# Patient Record
Sex: FEMALE | Race: BLACK OR AFRICAN AMERICAN | HISPANIC OR LATINO | Employment: STUDENT | URBAN - METROPOLITAN AREA
[De-identification: names, ages, dates, MRNs, and addresses within clinical notes are randomized per-mention and may not be internally consistent; named-entity substitution may affect disease eponyms.]

---
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
## 2021-01-08 ENCOUNTER — EVALUATION (OUTPATIENT)
Dept: PHYSICAL THERAPY | Facility: MEDICAL CENTER | Age: 21
End: 2021-01-08
Payer: COMMERCIAL

## 2021-01-08 DIAGNOSIS — M79.642 BILATERAL HAND PAIN: Primary | ICD-10-CM

## 2021-01-08 DIAGNOSIS — M79.641 BILATERAL HAND PAIN: Primary | ICD-10-CM

## 2021-01-08 DIAGNOSIS — M79.642 PAIN IN BOTH HANDS: ICD-10-CM

## 2021-01-08 DIAGNOSIS — M79.641 PAIN IN BOTH HANDS: ICD-10-CM

## 2021-01-08 PROCEDURE — 97161 PT EVAL LOW COMPLEX 20 MIN: CPT

## 2021-01-08 PROCEDURE — 97140 MANUAL THERAPY 1/> REGIONS: CPT

## 2021-01-08 NOTE — PROGRESS NOTES
PT Evaluation     Today's date: 2021  Patient name: Bibiana Zavala  : 2000  MRN: [id number]  Referring provider: Bon Villalta DO  Dx:   Encounter Diagnosis     ICD-10-CM    1  Bilateral hand pain  M79 641     [id number]            2/3/21: pt has not returned to therapy, D/C at this time       Assessment  Assessment details: Pt is a 22year old HD female who presents to PT with c/o BL hand pain  She presents with no tenderness upon palpation, full ROM of wrist and digits, no positive neuropathy tests, no visible atrophy or deformities, and no significant weakness/pain with testing in either hand  Pt to perform independent HEP consisting of stretching for wrist and digits as well as added strengthening with putty to help with muscle endurance in her intrinsics  We plan to follow up weekly with her home program to discuss any progress or issues that are continuing   Thank you kindly for your referral    Impairments: activity intolerance and pain with function  Barriers to therapy: Work schedule  Understanding of Dx/Px/POC: good   Prognosis: good    Goals  STG (2-3 weeks)  1: Pt independent in HEP  2: reduction of pain occurrence by 25%    LTG (3-4 weeks)  1: Improve FOTO 10 pts  2: Pt able to write without pain  3: improve pinch strength 2-3 lbs      Plan  Plan details: Initiate PT as per POC  Patient would benefit from: skilled physical therapy  Planned modality interventions: thermotherapy: hydrocollator packs and cryotherapy  Planned therapy interventions: manual therapy, massage, patient education, strengthening, stretching, therapeutic activities, therapeutic exercise, fine motor coordination training, flexibility, functional ROM exercises and home exercise program  Frequency: 1x week  Duration in visits: 3  Duration in weeks: 3  Plan of Care beginning date: 2021  Plan of Care expiration date: 2021  Treatment plan discussed with: patient        Subjective Evaluation    History of Present Illness  Mechanism of injury: Has been having cramping and pain in both hands for several years  Usually occurs when she is writing  No problem with lifting heavy items  New symptoms of shaking in L hand  Denies N/T- more cramping and tightness          Recurrent probem    Quality of life: good    Pain  Current pain ratin (L hand)  At best pain ratin  At worst pain ratin  Location: R hand > L  Quality: cramping  Progression: no change    Social Support    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)  Hand dominance: right  Exercise history: stepping ( dance)- clapping and stomping    Patient Goals  Patient goals for therapy: decreased pain and increased strength          Objective     Tenderness     Additional Tenderness Details  No tenderness to palpation  No visible atrophy to intrinsics    Neurological Testing     Sensation     Wrist/Hand   Left   Intact: light touch    Right   Intact: light touch    Additional Neurological Details  0 07g all digits west monofilament    Active Range of Motion     Left Wrist   Normal active range of motion    Right Wrist   Normal active range of motion    Additional Active Range of Motion Details  Full ROM of digits    Strength/Myotome Testing     Left Wrist/Hand      (2nd hand position)     Trial 1: 65    Thumb Strength  Key/Lateral Pinch     Trial 1: 16  Palmar/Three-Point Pinch     Trial 1: 9    Right Wrist/Hand      (2nd hand position)     Trial 1: 80    Thumb Strength   Key/Lateral Pinch     Trial 1: 20  Palmar/Three-Point Pinch     Trial 1: 13    Additional Strength Details  Thenar strength 4 to 4+/5 no pain with strength testing    Tests     Left Wrist/Hand   Negative Froment's sign, Phalen's sign and Tinel's sign (medial nerve)  Right Wrist/Hand   Negative Froment's sign, Phalen's sign and Tinel's sign (medial nerve)       Additional Tests Details  -reverse Phalen's             Precautions: ADHD, MDD, onset-3 years  PROM digits, wrist; putty (yellow) roll pinch, putty digit add/abd      Manuals             PROM digits, thenar, composite with wrist                                                    Neuro Re-Ed                                                                                                        Ther Ex             flexbar towel twist             flexbar sup/pron             fingerweb digit flexion             Wrist PRE's             clothespins                                                    Ther Activity             Grooved pegs                          Gait Training                                       Modalities                          CP PRN

## 2021-01-19 ENCOUNTER — OFFICE VISIT (OUTPATIENT)
Dept: FAMILY MEDICINE CLINIC | Facility: CLINIC | Age: 21
End: 2021-01-19
Payer: COMMERCIAL

## 2021-01-19 DIAGNOSIS — Z20.822 EXPOSURE TO COVID-19 VIRUS: Primary | ICD-10-CM

## 2021-01-19 PROCEDURE — 99213 OFFICE O/P EST LOW 20 MIN: CPT | Performed by: FAMILY MEDICINE

## 2021-01-19 PROCEDURE — 1036F TOBACCO NON-USER: CPT | Performed by: FAMILY MEDICINE

## 2021-01-19 PROCEDURE — 87635 SARS-COV-2 COVID-19 AMP PRB: CPT | Performed by: FAMILY MEDICINE

## 2021-01-19 NOTE — PROGRESS NOTES
Assessment/Plan:  COVID testing done at this time  Patient have supportive caring glued rest fluids Tylenol as needed       Diagnoses and all orders for this visit:    Exposure to COVID-19 virus  -     Novel Coronavirus (COVID-19), PCR [facility] Collected in Office            Subjective:        Patient ID: Norma villalobos a 22 y o  female  Patient is here with sore throat/pharyngitis with white dots on posterior pharynx past 5 days  Patient did have fever  Temperature was a 100 6° this was yesterday  Patient did use Advil and Tylenol  Patient with some body aches  No nausea vomiting or diarrhea  No loss of smell or taste  No significant new rhinorrhea or nasal congestion  Patient exposed to COVID positive patient on Saturday night  Patient was not masked  The following portions of the patient's history were reviewed and updated as appropriate: allergies, current medications, past family history, past medical history, past social history, past surgical history and problem list       Review of Systems   Constitutional: Positive for fatigue  HENT: Positive for sore throat  Eyes: Negative  Respiratory: Negative  Cardiovascular: Negative  Gastrointestinal: Negative  Endocrine: Negative  Genitourinary: Negative  Musculoskeletal: Positive for arthralgias  Skin: Negative  Allergic/Immunologic: Negative  Neurological: Negative  Hematological: Negative  Psychiatric/Behavioral: Negative  Objective:      BMI Counseling: There is no height or weight on file to calculate BMI  The BMI is above normal  Nutrition recommendations include decreasing portion sizes  Exercise recommendations include moderate physical activity 150 minutes/week  There were no vitals taken for this visit  Physical Exam  Vitals signs and nursing note reviewed  Constitutional:       General: She is not in acute distress  Appearance: Normal appearance   She is not ill-appearing, toxic-appearing or diaphoretic  HENT:      Head: Normocephalic and atraumatic  Right Ear: Tympanic membrane, ear canal and external ear normal  There is no impacted cerumen  Left Ear: Tympanic membrane, ear canal and external ear normal  There is no impacted cerumen  Nose: Nose normal  No congestion or rhinorrhea  Mouth/Throat:      Mouth: Mucous membranes are moist       Pharynx: No oropharyngeal exudate or posterior oropharyngeal erythema  Eyes:      General: No scleral icterus  Right eye: No discharge  Left eye: No discharge  Extraocular Movements: Extraocular movements intact  Conjunctiva/sclera: Conjunctivae normal       Pupils: Pupils are equal, round, and reactive to light  Neck:      Musculoskeletal: Normal range of motion and neck supple  No neck rigidity or muscular tenderness  Vascular: No carotid bruit  Cardiovascular:      Rate and Rhythm: Normal rate and regular rhythm  Pulses: Normal pulses  Heart sounds: Normal heart sounds  No murmur  No friction rub  No gallop  Pulmonary:      Effort: Pulmonary effort is normal  No respiratory distress  Breath sounds: Normal breath sounds  No stridor  No wheezing, rhonchi or rales  Chest:      Chest wall: No tenderness  Abdominal:      General: Abdomen is flat  Bowel sounds are normal  There is no distension  Palpations: Abdomen is soft  Tenderness: There is no abdominal tenderness  There is no guarding or rebound  Musculoskeletal: Normal range of motion  General: No swelling, tenderness, deformity or signs of injury  Right lower leg: No edema  Left lower leg: No edema  Lymphadenopathy:      Cervical: No cervical adenopathy  Skin:     General: Skin is warm and dry  Capillary Refill: Capillary refill takes less than 2 seconds  Coloration: Skin is not jaundiced  Findings: No bruising, erythema, lesion or rash     Neurological: General: No focal deficit present  Mental Status: She is alert and oriented to person, place, and time  Cranial Nerves: No cranial nerve deficit  Sensory: No sensory deficit  Motor: No weakness  Coordination: Coordination normal       Gait: Gait normal    Psychiatric:         Mood and Affect: Mood normal          Behavior: Behavior normal          Thought Content:  Thought content normal          Judgment: Judgment normal

## 2021-01-21 ENCOUNTER — TELEPHONE (OUTPATIENT)
Dept: FAMILY MEDICINE CLINIC | Facility: CLINIC | Age: 21
End: 2021-01-21

## 2021-01-21 ENCOUNTER — APPOINTMENT (OUTPATIENT)
Dept: PHYSICAL THERAPY | Facility: MEDICAL CENTER | Age: 21
End: 2021-01-21
Payer: COMMERCIAL

## 2021-01-21 DIAGNOSIS — J02.9 SORE THROAT: Primary | ICD-10-CM

## 2021-01-21 DIAGNOSIS — F32.0 CURRENT MILD EPISODE OF MAJOR DEPRESSIVE DISORDER WITHOUT PRIOR EPISODE (HCC): ICD-10-CM

## 2021-01-21 LAB — SARS-COV-2 RNA RESP QL NAA+PROBE: NEGATIVE

## 2021-01-21 RX ORDER — AMOXICILLIN 500 MG/1
500 TABLET, FILM COATED ORAL 2 TIMES DAILY
Qty: 28 TABLET | Refills: 0 | Status: SHIPPED | OUTPATIENT
Start: 2021-01-21 | End: 2021-02-04

## 2021-01-21 RX ORDER — BUPROPION HYDROCHLORIDE 150 MG/1
TABLET ORAL
Qty: 30 TABLET | Refills: 1 | Status: SHIPPED | OUTPATIENT
Start: 2021-01-21 | End: 2021-02-21

## 2021-01-21 NOTE — TELEPHONE ENCOUNTER
Patient's mother left message on nurse line  Patient had office visit 1/19, had negative COVID, but still has white pockets on throat  She wants something sent to [facility] for her  Also needs return to work note  I tried to call mother back at number given, no answer, phone kept ringing  Called patient cell phone number listed, left message to call back

## 2021-01-25 ENCOUNTER — TELEPHONE (OUTPATIENT)
Dept: FAMILY MEDICINE CLINIC | Facility: CLINIC | Age: 21
End: 2021-01-25

## 2021-01-25 NOTE — TELEPHONE ENCOUNTER
PT has not had a fever x 24 hrs and would like a return to work note  When do you want her to go back?

## 2021-02-20 DIAGNOSIS — F32.0 CURRENT MILD EPISODE OF MAJOR DEPRESSIVE DISORDER WITHOUT PRIOR EPISODE (HCC): ICD-10-CM

## 2021-02-21 RX ORDER — BUPROPION HYDROCHLORIDE 150 MG/1
TABLET ORAL
Qty: 30 TABLET | Refills: 1 | Status: SHIPPED | OUTPATIENT
Start: 2021-02-21 | End: 2021-03-26

## 2021-03-01 DIAGNOSIS — F90.1 ATTENTION DEFICIT HYPERACTIVITY DISORDER (ADHD), PREDOMINANTLY HYPERACTIVE TYPE: ICD-10-CM

## 2021-03-01 RX ORDER — DEXTROAMPHETAMINE SACCHARATE, AMPHETAMINE ASPARTATE MONOHYDRATE, DEXTROAMPHETAMINE SULFATE AND AMPHETAMINE SULFATE 6.25; 6.25; 6.25; 6.25 MG/1; MG/1; MG/1; MG/1
25 CAPSULE, EXTENDED RELEASE ORAL EVERY MORNING
Qty: 30 CAPSULE | Refills: 0 | Status: SHIPPED | OUTPATIENT
Start: 2021-03-01 | End: 2021-10-11 | Stop reason: SDUPTHER

## 2021-03-01 NOTE — TELEPHONE ENCOUNTER
Patient's mother called back requesting written letter with today's date stating that she is currently taking Adderall  Please advise  Thank you

## 2021-03-03 ENCOUNTER — OFFICE VISIT (OUTPATIENT)
Dept: OBGYN CLINIC | Facility: CLINIC | Age: 21
End: 2021-03-03
Payer: COMMERCIAL

## 2021-03-03 VITALS — DIASTOLIC BLOOD PRESSURE: 80 MMHG | BODY MASS INDEX: 35.07 KG/M2 | WEIGHT: 204.3 LBS | SYSTOLIC BLOOD PRESSURE: 115 MMHG

## 2021-03-03 DIAGNOSIS — B96.89 BV (BACTERIAL VAGINOSIS): ICD-10-CM

## 2021-03-03 DIAGNOSIS — Z72.51 HIGH RISK SEXUAL BEHAVIOR, UNSPECIFIED TYPE: ICD-10-CM

## 2021-03-03 DIAGNOSIS — N76.0 BV (BACTERIAL VAGINOSIS): ICD-10-CM

## 2021-03-03 DIAGNOSIS — Z30.09 BIRTH CONTROL COUNSELING: Primary | ICD-10-CM

## 2021-03-03 PROCEDURE — 87491 CHLMYD TRACH DNA AMP PROBE: CPT | Performed by: OBSTETRICS & GYNECOLOGY

## 2021-03-03 PROCEDURE — 99214 OFFICE O/P EST MOD 30 MIN: CPT | Performed by: OBSTETRICS & GYNECOLOGY

## 2021-03-03 PROCEDURE — 87591 N.GONORRHOEAE DNA AMP PROB: CPT | Performed by: OBSTETRICS & GYNECOLOGY

## 2021-03-03 PROCEDURE — 1036F TOBACCO NON-USER: CPT | Performed by: OBSTETRICS & GYNECOLOGY

## 2021-03-03 RX ORDER — MEDROXYPROGESTERONE ACETATE 150 MG/ML
150 INJECTION, SUSPENSION INTRAMUSCULAR
Qty: 1 ML | Refills: 3 | Status: SHIPPED | OUTPATIENT
Start: 2021-03-03 | End: 2021-12-09

## 2021-03-03 RX ORDER — METRONIDAZOLE 500 MG/1
500 TABLET ORAL EVERY 12 HOURS SCHEDULED
Qty: 14 TABLET | Refills: 0 | Status: SHIPPED | OUTPATIENT
Start: 2021-03-03 | End: 2021-03-10

## 2021-03-03 NOTE — PROGRESS NOTES
Assessment Parker Hector was seen today for possible infection  Diagnoses and all orders for this visit:    Birth control counseling  -     medroxyPROGESTERone (DEPO-PROVERA) 150 mg/mL injection; Inject 1 mL (150 mg total) into a muscle every 3 (three) months    High risk sexual behavior, unspecified type  -     Cancel: Chlamydia/GC amplified DNA by PCR; Future  -     Chlamydia/GC amplified DNA by PCR    BV (bacterial vaginosis)  -     metroNIDAZOLE (FLAGYL) 500 mg tablet; Take 1 tablet (500 mg total) by mouth every 12 (twelve) hours for 7 days         Plan  Discussed clue cells present on wet mount consistent with BV  Treatment options discussed ans sent to pharmacy/ G/C collected to verify no other cause of infection  We discussed available options of birth control that are less user dependant and therefore increase compliance, possible risks of each discussed   Mostly interested in Depo   This was sent to pharmacy and patient will need to shcedule nurse visit   Safe sex practices discussed as well as Plan B    Abel Uribe is a 22 y o  female here for a problem visit  Patient is complaining of vaginal discharge and odor for one week   Has not changed anything in her routine other than a new partner   Denies white discharge or irregular bleeding  States desires to discuss birth control options as she is not compliant with daily pill   Patient Active Problem List   Diagnosis    Allergic rhinitis    Attention deficit hyperactivity disorder    Headache, migraine    Vitamin D deficiency    Thyromegaly    Right hand pain    Rhinosinusitis    Functional diarrhea    Left foot pain    Costochondritis    Precordial pain    Tibial pain    Leg swelling    Current episode of major depressive disorder without prior episode    Pain in both hands       Gynecologic History  Patient's last menstrual period was 02/26/2021  The current method of family planning is none      Past Medical History: Diagnosis Date    Migraine      Past Surgical History:   Procedure Laterality Date    WISDOM TOOTH EXTRACTION       History reviewed  No pertinent family history  Social History     Socioeconomic History    Marital status: Single     Spouse name: Not on file    Number of children: Not on file    Years of education: Not on file    Highest education level: Not on file   Occupational History    Occupation:    Social Needs    Financial resource strain: Not on file    Food insecurity     Worry: Not on file     Inability: Not on file    Transportation needs     Medical: Not on file     Non-medical: Not on file   Tobacco Use    Smoking status: Never Smoker    Smokeless tobacco: Never Used   Substance and Sexual Activity    Alcohol use: Yes     Frequency: Never    Drug use: Yes     Types: Marijuana    Sexual activity: Yes     Partners: Male     Birth control/protection: Pill   Lifestyle    Physical activity     Days per week: Not on file     Minutes per session: Not on file    Stress: Not on file   Relationships    Social connections     Talks on phone: Not on file     Gets together: Not on file     Attends [employer] service: Not on file     Active member of club or organization: Not on file     Attends meetings of clubs or organizations: Not on file     Relationship status: Not on file    Intimate partner violence     Fear of current or ex partner: Not on file     Emotionally abused: Not on file     Physically abused: Not on file     Forced sexual activity: Not on file   Other Topics Concern    Not on file   Social History Narrative    Not on file     Allergies   Allergen Reactions    Kiwi Extract     Other Other (See Comments)     Cherries, sesame seed, clams, bananas  Patient has itching in throat      Peanut Oil      Elevated titer with labs       Current Outpatient Medications:     amphetamine-dextroamphetamine (ADDERALL XR) 25 MG 24 hr capsule, Take 1 capsule (25 mg total) by mouth every morningMax Daily Amount: 25 mg, Disp: 30 capsule, Rfl: 0    buPROPion (WELLBUTRIN XL) 150 mg 24 hr tablet, TAKE 1 TABLET BY MOUTH EVERY DAY IN THE MORNING, Disp: 30 tablet, Rfl: 1    EPINEPHrine (EpiPen 2-Jacinto) 0 3 mg/0 3 mL SOAJ, Inject 0 3 mL (0 3 mg total) into a muscle as needed for anaphylaxis, Disp: 4 each, Rfl: 1    montelukast (SINGULAIR) 10 mg tablet, Take 1 tablet (10 mg total) by mouth daily, Disp: 90 tablet, Rfl: 1    medroxyPROGESTERone (DEPO-PROVERA) 150 mg/mL injection, Inject 1 mL (150 mg total) into a muscle every 3 (three) months, Disp: 1 mL, Rfl: 3    metroNIDAZOLE (FLAGYL) 500 mg tablet, Take 1 tablet (500 mg total) by mouth every 12 (twelve) hours for 7 days, Disp: 14 tablet, Rfl: 0    norgestimate-ethinyl estradiol (ORTHO-CYCLEN) 0 25-35 MG-MCG per tablet, Take 1 tablet by mouth daily (Patient not taking: Reported on 3/3/2021), Disp: 84 tablet, Rfl: 3    Review of Systems  Constitutional :no fever, feels well, no tiredness, no recent weight gain or loss  ENT: no ear ache, no loss of hearing, no nosebleeds or nasal discharge, no sore throat or hoarseness  Cardiovascular: no complaints of slow or fast heart beat, no chest pain, no palpitations, no leg claudication or lower extremity edema  Respiratory: no complaints of shortness of shortness of breath, no MEYERS  Breasts:no complaints of breast pain, breast lump, or nipple discharge  Gastrointestinal: no complaints of abdominal pain, constipation, nausea, vomiting, or diarrhea or bloody stools  Genitourinary : no complaints of dysuria, incontinence, pelvic pain, no dysmenorrhea, vaginal discharge or abnormal vaginal bleeding and as noted in HPI  Musculoskeletal: no complaints of arthralgia, no myalgia, no joint swelling or stiffness, no limb pain or swelling    Integumentary: no complaints of skin rash or lesion, itching or dry skin  Neurological: no complaints of headache, no confusion, no numbness or tingling, no dizziness or fainting     Objective     /80   Wt 92 7 kg (204 lb 4 8 oz)   LMP 02/26/2021   BMI 35 07 kg/m²     General:   appears stated age, cooperative, alert normal mood and affect   Lungs: Unlabored breathing    Abdomen: soft, non-tender, without masses or organomegaly   Vulva: normal   Vagina: normal vagina, no discharge, exudate, lesion, or erythema   Urethra: normal   Cervix: Normal, no discharge  Nontender  Uterus: normal size, contour, position, consistency, mobility, non-tender   Adnexa: no mass, fullness, tenderness   Lymphatic palpation of lymph nodes in neck, axilla, groin and/or other locations: no lymphadenopathy or masses noted   Skin normal skin turgor and no rashes     Psychiatric orientation to person, place, and time: normal  mood and affect: normal   koh  And wet mount done - positive for clue cells

## 2021-03-04 LAB
C TRACH DNA SPEC QL NAA+PROBE: NEGATIVE
N GONORRHOEA DNA SPEC QL NAA+PROBE: NEGATIVE

## 2021-03-25 DIAGNOSIS — F32.0 CURRENT MILD EPISODE OF MAJOR DEPRESSIVE DISORDER WITHOUT PRIOR EPISODE (HCC): ICD-10-CM

## 2021-03-26 RX ORDER — BUPROPION HYDROCHLORIDE 150 MG/1
TABLET ORAL
Qty: 30 TABLET | Refills: 1 | Status: SHIPPED | OUTPATIENT
Start: 2021-03-26 | End: 2021-04-30

## 2021-04-27 ENCOUNTER — OFFICE VISIT (OUTPATIENT)
Dept: FAMILY MEDICINE CLINIC | Facility: CLINIC | Age: 21
End: 2021-04-27
Payer: COMMERCIAL

## 2021-04-27 VITALS — TEMPERATURE: 98.6 F

## 2021-04-27 DIAGNOSIS — J03.91 RECURRENT TONSILLITIS: Primary | ICD-10-CM

## 2021-04-27 PROCEDURE — 99213 OFFICE O/P EST LOW 20 MIN: CPT | Performed by: FAMILY MEDICINE

## 2021-04-27 RX ORDER — AMOXICILLIN AND CLAVULANATE POTASSIUM 875; 125 MG/1; MG/1
1 TABLET, FILM COATED ORAL EVERY 12 HOURS SCHEDULED
Qty: 14 TABLET | Refills: 0 | Status: SHIPPED | OUTPATIENT
Start: 2021-04-27 | End: 2021-05-04

## 2021-04-27 NOTE — PROGRESS NOTES
Assessment/Plan: patient use Augmentin as directed  Due to recurrence patient will see ENT  Diagnoses and all orders for this visit:    Recurrent tonsillitis  -     amoxicillin-clavulanate (AUGMENTIN) 875-125 mg per tablet; Take 1 tablet by mouth every 12 (twelve) hours for 7 days  -     Ambulatory Referral to Otolaryngology; Future            Subjective:        Patient ID: Kingsley Summers is a 22 y o  female  Patient is here with pharyngitis /tonsillar swelling over the past week  No fevers or chills  No significant URI symptoms beyond this  No loss of smell or taste or diffuse body aches or nausea vomiting  No other complaints  No significant treatment use  Patient has notice recurrence pharyngitis/tonsillitis        The following portions of the patient's history were reviewed and updated as appropriate: allergies, current medications, past family history, past medical history, past social history, past surgical history and problem list       Review of Systems   Constitutional: Negative  HENT: Positive for sore throat  Eyes: Negative  Respiratory: Negative  Cardiovascular: Negative  Gastrointestinal: Negative  Endocrine: Negative  Genitourinary: Negative  Musculoskeletal: Negative  Skin: Negative  Allergic/Immunologic: Negative  Neurological: Negative  Hematological: Negative  Psychiatric/Behavioral: Negative  Objective:               Temp 98 6 °F (37 °C) (Tympanic)   LMP 04/15/2021 (Approximate)          Physical Exam  Vitals signs and nursing note reviewed  Constitutional:       General: She is not in acute distress  Appearance: Normal appearance  She is not ill-appearing, toxic-appearing or diaphoretic  HENT:      Head: Normocephalic and atraumatic  Right Ear: Tympanic membrane, ear canal and external ear normal  There is no impacted cerumen        Left Ear: Tympanic membrane, ear canal and external ear normal  There is no impacted cerumen  Nose: Nose normal  No congestion or rhinorrhea  Mouth/Throat:      Mouth: Mucous membranes are moist       Pharynx: Posterior oropharyngeal erythema present  No oropharyngeal exudate  Eyes:      General: No scleral icterus  Right eye: No discharge  Left eye: No discharge  Extraocular Movements: Extraocular movements intact  Conjunctiva/sclera: Conjunctivae normal       Pupils: Pupils are equal, round, and reactive to light  Neck:      Musculoskeletal: Normal range of motion and neck supple  No neck rigidity or muscular tenderness  Vascular: No carotid bruit  Cardiovascular:      Rate and Rhythm: Normal rate and regular rhythm  Pulses: Normal pulses  Heart sounds: Normal heart sounds  No murmur  No friction rub  No gallop  Pulmonary:      Effort: Pulmonary effort is normal  No respiratory distress  Breath sounds: Normal breath sounds  No stridor  No wheezing, rhonchi or rales  Chest:      Chest wall: No tenderness  Musculoskeletal: Normal range of motion  General: No swelling, tenderness, deformity or signs of injury  Right lower leg: No edema  Left lower leg: No edema  Lymphadenopathy:      Cervical: No cervical adenopathy  Skin:     General: Skin is warm and dry  Capillary Refill: Capillary refill takes less than 2 seconds  Coloration: Skin is not jaundiced  Findings: No bruising, erythema, lesion or rash  Neurological:      Mental Status: She is alert and oriented to person, place, and time  Mental status is at baseline  Cranial Nerves: No cranial nerve deficit  Sensory: No sensory deficit  Motor: No weakness  Coordination: Coordination normal       Gait: Gait normal    Psychiatric:         Mood and Affect: Mood normal          Behavior: Behavior normal          Thought Content:  Thought content normal          Judgment: Judgment normal

## 2021-04-30 DIAGNOSIS — F32.0 CURRENT MILD EPISODE OF MAJOR DEPRESSIVE DISORDER WITHOUT PRIOR EPISODE (HCC): ICD-10-CM

## 2021-04-30 RX ORDER — BUPROPION HYDROCHLORIDE 150 MG/1
TABLET ORAL
Qty: 30 TABLET | Refills: 1 | Status: SHIPPED | OUTPATIENT
Start: 2021-04-30 | End: 2021-05-14

## 2021-05-14 DIAGNOSIS — F32.0 CURRENT MILD EPISODE OF MAJOR DEPRESSIVE DISORDER WITHOUT PRIOR EPISODE (HCC): ICD-10-CM

## 2021-05-14 RX ORDER — BUPROPION HYDROCHLORIDE 150 MG/1
TABLET ORAL
Qty: 90 TABLET | Refills: 0 | Status: SHIPPED | OUTPATIENT
Start: 2021-05-14 | End: 2021-12-09 | Stop reason: SDUPTHER

## 2021-07-03 DIAGNOSIS — J45.909 ASTHMA DUE TO ENVIRONMENTAL ALLERGIES: ICD-10-CM

## 2021-07-04 RX ORDER — MONTELUKAST SODIUM 10 MG/1
TABLET ORAL
Qty: 90 TABLET | Refills: 1 | Status: SHIPPED | OUTPATIENT
Start: 2021-07-04 | End: 2021-10-11 | Stop reason: SDUPTHER

## 2021-08-16 ENCOUNTER — APPOINTMENT (OUTPATIENT)
Dept: LAB | Facility: MEDICAL CENTER | Age: 21
End: 2021-08-16
Payer: COMMERCIAL

## 2021-08-16 DIAGNOSIS — Z11.3 SCREENING FOR STDS (SEXUALLY TRANSMITTED DISEASES): Primary | ICD-10-CM

## 2021-08-16 DIAGNOSIS — Z11.3 SCREENING FOR STDS (SEXUALLY TRANSMITTED DISEASES): ICD-10-CM

## 2021-08-16 PROCEDURE — 87591 N.GONORRHOEAE DNA AMP PROB: CPT

## 2021-08-16 PROCEDURE — 86592 SYPHILIS TEST NON-TREP QUAL: CPT

## 2021-08-16 PROCEDURE — 87491 CHLMYD TRACH DNA AMP PROBE: CPT

## 2021-08-16 PROCEDURE — 36415 COLL VENOUS BLD VENIPUNCTURE: CPT

## 2021-08-16 PROCEDURE — 86803 HEPATITIS C AB TEST: CPT

## 2021-08-16 PROCEDURE — 87389 HIV-1 AG W/HIV-1&-2 AB AG IA: CPT

## 2021-08-16 PROCEDURE — 87340 HEPATITIS B SURFACE AG IA: CPT

## 2021-08-17 LAB
C TRACH DNA SPEC QL NAA+PROBE: NEGATIVE
HBV SURFACE AG SER QL: NORMAL
HCV AB SER QL: NORMAL
HIV 1+2 AB+HIV1 P24 AG SERPL QL IA: NORMAL
N GONORRHOEA DNA SPEC QL NAA+PROBE: NEGATIVE
RPR SER QL: NORMAL

## 2021-10-11 DIAGNOSIS — J45.909 ASTHMA DUE TO ENVIRONMENTAL ALLERGIES: ICD-10-CM

## 2021-10-11 DIAGNOSIS — F90.1 ATTENTION DEFICIT HYPERACTIVITY DISORDER (ADHD), PREDOMINANTLY HYPERACTIVE TYPE: ICD-10-CM

## 2021-10-12 RX ORDER — DEXTROAMPHETAMINE SACCHARATE, AMPHETAMINE ASPARTATE MONOHYDRATE, DEXTROAMPHETAMINE SULFATE AND AMPHETAMINE SULFATE 6.25; 6.25; 6.25; 6.25 MG/1; MG/1; MG/1; MG/1
25 CAPSULE, EXTENDED RELEASE ORAL EVERY MORNING
Qty: 30 CAPSULE | Refills: 0 | Status: SHIPPED | OUTPATIENT
Start: 2021-10-12 | End: 2022-02-08

## 2021-10-12 RX ORDER — MONTELUKAST SODIUM 10 MG/1
10 TABLET ORAL DAILY
Qty: 30 TABLET | Refills: 2 | Status: SHIPPED | OUTPATIENT
Start: 2021-10-12 | End: 2022-04-26

## 2021-12-09 ENCOUNTER — OFFICE VISIT (OUTPATIENT)
Dept: FAMILY MEDICINE CLINIC | Facility: CLINIC | Age: 21
End: 2021-12-09
Payer: COMMERCIAL

## 2021-12-09 VITALS
BODY MASS INDEX: 36.77 KG/M2 | HEIGHT: 64 IN | DIASTOLIC BLOOD PRESSURE: 88 MMHG | WEIGHT: 215.4 LBS | SYSTOLIC BLOOD PRESSURE: 126 MMHG | TEMPERATURE: 96.9 F

## 2021-12-09 DIAGNOSIS — Z00.00 WELL ADULT EXAM: Primary | ICD-10-CM

## 2021-12-09 DIAGNOSIS — Z23 NEED FOR IMMUNIZATION AGAINST INFLUENZA: ICD-10-CM

## 2021-12-09 DIAGNOSIS — F32.0 CURRENT MILD EPISODE OF MAJOR DEPRESSIVE DISORDER WITHOUT PRIOR EPISODE (HCC): ICD-10-CM

## 2021-12-09 PROCEDURE — 90682 RIV4 VACC RECOMBINANT DNA IM: CPT

## 2021-12-09 PROCEDURE — 90471 IMMUNIZATION ADMIN: CPT

## 2021-12-09 PROCEDURE — 99395 PREV VISIT EST AGE 18-39: CPT | Performed by: FAMILY MEDICINE

## 2021-12-09 RX ORDER — BUPROPION HYDROCHLORIDE 150 MG/1
150 TABLET ORAL EVERY MORNING
Qty: 90 TABLET | Refills: 0 | Status: SHIPPED | OUTPATIENT
Start: 2021-12-09 | End: 2022-02-08

## 2021-12-16 ENCOUNTER — LAB (OUTPATIENT)
Dept: LAB | Facility: MEDICAL CENTER | Age: 21
End: 2021-12-16
Payer: COMMERCIAL

## 2021-12-16 DIAGNOSIS — Z00.00 WELL ADULT EXAM: ICD-10-CM

## 2021-12-16 LAB
ALBUMIN SERPL BCP-MCNC: 4.2 G/DL (ref 3.5–5)
ALP SERPL-CCNC: 96 U/L (ref 46–116)
ALT SERPL W P-5'-P-CCNC: 23 U/L (ref 12–78)
ANION GAP SERPL CALCULATED.3IONS-SCNC: 10 MMOL/L (ref 4–13)
AST SERPL W P-5'-P-CCNC: 12 U/L (ref 5–45)
BASOPHILS # BLD AUTO: 0.05 THOUSANDS/ΜL (ref 0–0.1)
BASOPHILS NFR BLD AUTO: 0 % (ref 0–1)
BILIRUB SERPL-MCNC: 0.82 MG/DL (ref 0.2–1)
BUN SERPL-MCNC: 7 MG/DL (ref 5–25)
CALCIUM SERPL-MCNC: 9.8 MG/DL (ref 8.3–10.1)
CHLORIDE SERPL-SCNC: 109 MMOL/L (ref 100–108)
CHOLEST SERPL-MCNC: 148 MG/DL
CO2 SERPL-SCNC: 18 MMOL/L (ref 21–32)
CREAT SERPL-MCNC: 0.8 MG/DL (ref 0.6–1.3)
EOSINOPHIL # BLD AUTO: 0.29 THOUSAND/ΜL (ref 0–0.61)
EOSINOPHIL NFR BLD AUTO: 2 % (ref 0–6)
ERYTHROCYTE [DISTWIDTH] IN BLOOD BY AUTOMATED COUNT: 14.5 % (ref 11.6–15.1)
GFR SERPL CREATININE-BSD FRML MDRD: 106 ML/MIN/1.73SQ M
GLUCOSE P FAST SERPL-MCNC: 80 MG/DL (ref 65–99)
HCT VFR BLD AUTO: 36.9 % (ref 34.8–46.1)
HDLC SERPL-MCNC: 35 MG/DL
HGB BLD-MCNC: 12 G/DL (ref 11.5–15.4)
IMM GRANULOCYTES # BLD AUTO: 0.03 THOUSAND/UL (ref 0–0.2)
IMM GRANULOCYTES NFR BLD AUTO: 0 % (ref 0–2)
LDLC SERPL CALC-MCNC: 92 MG/DL (ref 0–100)
LYMPHOCYTES # BLD AUTO: 3.31 THOUSANDS/ΜL (ref 0.6–4.47)
LYMPHOCYTES NFR BLD AUTO: 27 % (ref 14–44)
MCH RBC QN AUTO: 26 PG (ref 26.8–34.3)
MCHC RBC AUTO-ENTMCNC: 32.5 G/DL (ref 31.4–37.4)
MCV RBC AUTO: 80 FL (ref 82–98)
MONOCYTES # BLD AUTO: 1.03 THOUSAND/ΜL (ref 0.17–1.22)
MONOCYTES NFR BLD AUTO: 8 % (ref 4–12)
NEUTROPHILS # BLD AUTO: 7.79 THOUSANDS/ΜL (ref 1.85–7.62)
NEUTS SEG NFR BLD AUTO: 63 % (ref 43–75)
NONHDLC SERPL-MCNC: 113 MG/DL
NRBC BLD AUTO-RTO: 0 /100 WBCS
PLATELET # BLD AUTO: 400 THOUSANDS/UL (ref 149–390)
PMV BLD AUTO: 9.2 FL (ref 8.9–12.7)
POTASSIUM SERPL-SCNC: 3.7 MMOL/L (ref 3.5–5.3)
PROT SERPL-MCNC: 8.4 G/DL (ref 6.4–8.2)
RBC # BLD AUTO: 4.61 MILLION/UL (ref 3.81–5.12)
SODIUM SERPL-SCNC: 137 MMOL/L (ref 136–145)
TRIGL SERPL-MCNC: 106 MG/DL
TSH SERPL DL<=0.05 MIU/L-ACNC: 2.86 UIU/ML (ref 0.46–3.98)
WBC # BLD AUTO: 12.5 THOUSAND/UL (ref 4.31–10.16)

## 2021-12-16 PROCEDURE — 84443 ASSAY THYROID STIM HORMONE: CPT

## 2021-12-16 PROCEDURE — 80053 COMPREHEN METABOLIC PANEL: CPT

## 2021-12-16 PROCEDURE — 80061 LIPID PANEL: CPT

## 2021-12-16 PROCEDURE — 85025 COMPLETE CBC W/AUTO DIFF WBC: CPT

## 2021-12-16 PROCEDURE — 36415 COLL VENOUS BLD VENIPUNCTURE: CPT

## 2021-12-17 ENCOUNTER — TELEPHONE (OUTPATIENT)
Dept: FAMILY MEDICINE CLINIC | Facility: CLINIC | Age: 21
End: 2021-12-17

## 2021-12-17 DIAGNOSIS — J02.9 SORE THROAT: Primary | ICD-10-CM

## 2021-12-17 RX ORDER — AZITHROMYCIN 250 MG/1
TABLET, FILM COATED ORAL
Qty: 6 TABLET | Refills: 0
Start: 2021-12-17 | End: 2021-12-22

## 2022-01-11 ENCOUNTER — IMMUNIZATIONS (OUTPATIENT)
Dept: FAMILY MEDICINE CLINIC | Facility: HOSPITAL | Age: 22
End: 2022-01-11

## 2022-01-11 DIAGNOSIS — Z23 ENCOUNTER FOR IMMUNIZATION: Primary | ICD-10-CM

## 2022-01-11 PROCEDURE — 0064A COVID-19 MODERNA VACC 0.25 ML BOOSTER: CPT

## 2022-01-11 PROCEDURE — 91306 COVID-19 MODERNA VACC 0.25 ML BOOSTER: CPT

## 2022-01-12 ENCOUNTER — TELEPHONE (OUTPATIENT)
Dept: FAMILY MEDICINE CLINIC | Facility: CLINIC | Age: 22
End: 2022-01-12

## 2022-01-12 NOTE — TELEPHONE ENCOUNTER
Patient called the office stating she need a letter for her employer stating her adhd is well under controlled and that she has not been needing her medication for over a year

## 2022-01-14 NOTE — TELEPHONE ENCOUNTER
Called pharmacy, she never picked up the Adderall in October since it needed a prior auth  The last one she got was in march of last year

## 2022-01-18 DIAGNOSIS — T78.2XXA ANAPHYLAXIS, INITIAL ENCOUNTER: ICD-10-CM

## 2022-01-18 RX ORDER — EPINEPHRINE 0.3 MG/.3ML
0.3 INJECTION SUBCUTANEOUS AS NEEDED
Qty: 4 EACH | Refills: 1 | Status: SHIPPED | OUTPATIENT
Start: 2022-01-18

## 2022-02-08 ENCOUNTER — OFFICE VISIT (OUTPATIENT)
Dept: FAMILY MEDICINE CLINIC | Facility: CLINIC | Age: 22
End: 2022-02-08
Payer: COMMERCIAL

## 2022-02-08 VITALS
HEART RATE: 72 BPM | TEMPERATURE: 99.4 F | BODY MASS INDEX: 37.56 KG/M2 | DIASTOLIC BLOOD PRESSURE: 70 MMHG | SYSTOLIC BLOOD PRESSURE: 120 MMHG | WEIGHT: 220 LBS | OXYGEN SATURATION: 98 % | HEIGHT: 64 IN

## 2022-02-08 DIAGNOSIS — Z00.00 WELL ADULT EXAM: Primary | ICD-10-CM

## 2022-02-08 DIAGNOSIS — F90.1 ATTENTION DEFICIT HYPERACTIVITY DISORDER (ADHD), PREDOMINANTLY HYPERACTIVE TYPE: ICD-10-CM

## 2022-02-08 PROCEDURE — 99395 PREV VISIT EST AGE 18-39: CPT | Performed by: FAMILY MEDICINE

## 2022-02-08 RX ORDER — DEXTROAMPHETAMINE SACCHARATE, AMPHETAMINE ASPARTATE MONOHYDRATE, DEXTROAMPHETAMINE SULFATE AND AMPHETAMINE SULFATE 2.5; 2.5; 2.5; 2.5 MG/1; MG/1; MG/1; MG/1
10 CAPSULE, EXTENDED RELEASE ORAL EVERY MORNING
Qty: 30 CAPSULE | Refills: 0 | Status: SHIPPED | OUTPATIENT
Start: 2022-02-08

## 2022-02-08 NOTE — PROGRESS NOTES
Assessment/Plan:  Patient will restart Adderall for ADHD  Patient will start at 10 mg daily  Refill given at this time  Patient will remain off Wellbutrin as she has been off for multiple months without any difficulty  No suicidal attempts or ECT therapy or personality disorder or psychosis noted  Judgment and attention is normal   No substance abuse  No functional impairment with ADHD or depression in the last 2 months  Note for work given at this time  Patient will follow-up in 3 months or as needed       Diagnoses and all orders for this visit:    Well adult exam    Attention deficit hyperactivity disorder (ADHD), predominantly hyperactive type  -     amphetamine-dextroamphetamine (ADDERALL XR, 10MG,) 10 MG 24 hr capsule; Take 1 capsule (10 mg total) by mouth every morning Max Daily Amount: 10 mg            Subjective:        Patient ID: Lacinda Denver is a 25 y o  female  Patient is here for wellness examination  Patient has been off ADD medications routinely  Patient also off Wellbutrin  Patient is working  No difficulty with concentration or focus off ADD medications  Patient feeling depressed or anxious  Feels stable at this time  no chest pain or shortness of breath or problems urinating or defecating  The following portions of the patient's history were reviewed and updated as appropriate: allergies, current medications, past family history, past medical history, past social history, past surgical history and problem list       Review of Systems   Constitutional: Negative  HENT: Negative  Eyes: Negative  Respiratory: Negative  Cardiovascular: Negative  Gastrointestinal: Negative  Endocrine: Negative  Genitourinary: Negative  Musculoskeletal: Negative  Skin: Negative  Allergic/Immunologic: Negative  Neurological: Negative  Hematological: Negative  Psychiatric/Behavioral: Negative  Objective:      BMI Counseling:  Body mass index is 37 76 kg/m²  The BMI is above normal  Nutrition recommendations include decreasing portion sizes  Exercise recommendations include moderate physical activity 150 minutes/week  Rationale for BMI follow-up plan is due to patient being overweight or obese  /70   Pulse 72   Temp 99 4 °F (37 4 °C)   Ht 5' 4" (1 626 m)   Wt 99 8 kg (220 lb)   SpO2 98%   BMI 37 76 kg/m²          Physical Exam  Vitals and nursing note reviewed  Constitutional:       General: She is not in acute distress  Appearance: Normal appearance  She is not ill-appearing, toxic-appearing or diaphoretic  HENT:      Head: Normocephalic and atraumatic  Right Ear: Tympanic membrane, ear canal and external ear normal  There is no impacted cerumen  Left Ear: Tympanic membrane, ear canal and external ear normal  There is no impacted cerumen  Nose: Nose normal  No congestion or rhinorrhea  Mouth/Throat:      Mouth: Mucous membranes are moist       Pharynx: No oropharyngeal exudate or posterior oropharyngeal erythema  Eyes:      General: No scleral icterus  Right eye: No discharge  Left eye: No discharge  Extraocular Movements: Extraocular movements intact  Conjunctiva/sclera: Conjunctivae normal       Pupils: Pupils are equal, round, and reactive to light  Neck:      Vascular: No carotid bruit  Cardiovascular:      Rate and Rhythm: Normal rate and regular rhythm  Pulses: Normal pulses  Heart sounds: Normal heart sounds  No murmur heard  No friction rub  No gallop  Pulmonary:      Effort: Pulmonary effort is normal  No respiratory distress  Breath sounds: Normal breath sounds  No stridor  No wheezing, rhonchi or rales  Chest:      Chest wall: No tenderness  Abdominal:      General: Abdomen is flat  Bowel sounds are normal  There is no distension  Palpations: Abdomen is soft  Tenderness: There is no abdominal tenderness   There is no guarding or rebound  Musculoskeletal:         General: No swelling, tenderness, deformity or signs of injury  Normal range of motion  Cervical back: Normal range of motion and neck supple  No rigidity  No muscular tenderness  Right lower leg: No edema  Left lower leg: No edema  Lymphadenopathy:      Cervical: No cervical adenopathy  Skin:     General: Skin is warm and dry  Capillary Refill: Capillary refill takes less than 2 seconds  Coloration: Skin is not jaundiced  Findings: No bruising, erythema, lesion or rash  Neurological:      Mental Status: She is alert and oriented to person, place, and time  Mental status is at baseline  Cranial Nerves: No cranial nerve deficit  Sensory: No sensory deficit  Motor: No weakness  Coordination: Coordination normal       Gait: Gait normal    Psychiatric:         Mood and Affect: Mood normal          Behavior: Behavior normal          Thought Content:  Thought content normal          Judgment: Judgment normal

## 2022-02-10 DIAGNOSIS — G43.009 MIGRAINE WITHOUT AURA AND WITHOUT STATUS MIGRAINOSUS, NOT INTRACTABLE: Primary | ICD-10-CM

## 2022-02-10 NOTE — TELEPHONE ENCOUNTER
Pt requesting Motrin 600 mgs - I saw this was taken off her med list   OK to place back on and fill it?

## 2022-02-11 RX ORDER — IBUPROFEN 600 MG/1
600 TABLET ORAL EVERY 6 HOURS PRN
Qty: 60 TABLET | Refills: 0 | Status: SHIPPED | OUTPATIENT
Start: 2022-02-11 | End: 2022-06-20 | Stop reason: SDUPTHER

## 2022-02-14 ENCOUNTER — TELEPHONE (OUTPATIENT)
Dept: FAMILY MEDICINE CLINIC | Facility: CLINIC | Age: 22
End: 2022-02-14

## 2022-02-14 NOTE — TELEPHONE ENCOUNTER
Patient called in stating she spoke with you at her appointment in regards to getting a letter about her medications  Please review and advise

## 2022-02-15 NOTE — TELEPHONE ENCOUNTER
I spoke with pt, she said that you have a paper with the guidelines for what she needs? I don't see anything in her chart

## 2022-06-20 DIAGNOSIS — G43.009 MIGRAINE WITHOUT AURA AND WITHOUT STATUS MIGRAINOSUS, NOT INTRACTABLE: ICD-10-CM

## 2022-06-20 RX ORDER — IBUPROFEN 600 MG/1
600 TABLET ORAL EVERY 6 HOURS PRN
Qty: 60 TABLET | Refills: 0 | Status: SHIPPED | OUTPATIENT
Start: 2022-06-20 | End: 2022-08-10

## 2022-08-10 DIAGNOSIS — G43.009 MIGRAINE WITHOUT AURA AND WITHOUT STATUS MIGRAINOSUS, NOT INTRACTABLE: ICD-10-CM

## 2022-08-10 RX ORDER — IBUPROFEN 600 MG/1
600 TABLET ORAL EVERY 6 HOURS PRN
Qty: 60 TABLET | Refills: 0 | Status: SHIPPED | OUTPATIENT
Start: 2022-08-10 | End: 2022-10-09

## 2022-09-30 ENCOUNTER — OFFICE VISIT (OUTPATIENT)
Dept: OBGYN CLINIC | Facility: CLINIC | Age: 22
End: 2022-09-30
Payer: COMMERCIAL

## 2022-09-30 VITALS
HEIGHT: 64 IN | WEIGHT: 218.8 LBS | BODY MASS INDEX: 37.36 KG/M2 | DIASTOLIC BLOOD PRESSURE: 74 MMHG | SYSTOLIC BLOOD PRESSURE: 118 MMHG

## 2022-09-30 DIAGNOSIS — Z01.419 ENCOUNTER FOR WELL WOMAN EXAM WITH ROUTINE GYNECOLOGICAL EXAM: Primary | ICD-10-CM

## 2022-09-30 DIAGNOSIS — Z11.3 SCREENING FOR STD (SEXUALLY TRANSMITTED DISEASE): ICD-10-CM

## 2022-09-30 PROCEDURE — G0145 SCR C/V CYTO,THINLAYER,RESCR: HCPCS | Performed by: OBSTETRICS & GYNECOLOGY

## 2022-09-30 PROCEDURE — 87491 CHLMYD TRACH DNA AMP PROBE: CPT | Performed by: OBSTETRICS & GYNECOLOGY

## 2022-09-30 PROCEDURE — S0612 ANNUAL GYNECOLOGICAL EXAMINA: HCPCS | Performed by: OBSTETRICS & GYNECOLOGY

## 2022-09-30 PROCEDURE — 87591 N.GONORRHOEAE DNA AMP PROB: CPT | Performed by: OBSTETRICS & GYNECOLOGY

## 2022-09-30 NOTE — PROGRESS NOTES
ASSESSMENT & PLAN: Bob Mays is a 25 y o  [place] Ceron with normal gynecologic exam     1   Routine well woman exam done today  2  Pap:  The patient's last pap was never  Pap was done today  Current ASCCP Guidelines reviewed  3   STD testing  was done   4  Reviewed birth control options , safe sex practices  Desires to think about birth control options    Seems most interested on patch   5  The following were reviewed in today's visit: breast self exam, HIV risk factors and prevention, use and side effects of OCPs, family planning choices, exercise and healthy diet      CC:  Annual Gynecologic Examination    HPI: Bob aMys is a 25 y o  [place] Ceron who presents for annual gynecologic examination  She has the following concerns:  Had bleeding with intercourse and thinks secondary to a small laceration that has since resolved      Health Maintenance:    She wears her seatbelt routinely  She does perform irregular monthly self breast exams  She feels safe at home  Past Medical History:   Diagnosis Date    ADHD     Allergic     Anxiety     Depression     Migraine        Past Surgical History:   Procedure Laterality Date    WISDOM TOOTH EXTRACTION         OB/Gyn History:    Pt does not have menstrual issues  History of sexually transmitted infection: No   History of abnormal pap smears: No      Patient is currently sexually active  The current method of family planning is condoms most of the time  OB History        0    Para   0    Term   0       0    AB   0    Living   0       SAB   0    IAB   0    Ectopic   0    Multiple   0    Live Births   0                 History reviewed  No pertinent family history      Social History:  Social History     Socioeconomic History    Marital status: Single     Spouse name: Not on file    Number of children: Not on file    Years of education: Not on file    Highest education level: Not on file   Occupational History    Occupation:    Tobacco Use    Smoking status: Never Smoker    Smokeless tobacco: Never Used   Vaping Use    Vaping Use: Every day    Substances: Nicotine, THC, Flavoring   Substance and Sexual Activity    Alcohol use: Yes    Drug use: Yes     Types: Marijuana    Sexual activity: Yes     Partners: Male     Birth control/protection: None   Other Topics Concern    Not on file   Social History Narrative    Not on file     Social Determinants of Health     Financial Resource Strain: Not on file   Food Insecurity: Not on file   Transportation Needs: Not on file   Physical Activity: Not on file   Stress: Not on file   Social Connections: Not on file   Intimate Partner Violence: Not on file   Housing Stability: Not on file         Allergies   Allergen Reactions    Kiwi Extract - Food Allergy     Other Other (See Comments)     Cherries, sesame seed, clams, bananas  Patient has itching in throat   Peanut Oil - Food Allergy      Elevated titer with labs         Current Outpatient Medications:     EPINEPHrine (EpiPen 2-Jacinto) 0 3 mg/0 3 mL SOAJ, Inject 0 3 mL (0 3 mg total) into a muscle as needed for anaphylaxis, Disp: 4 each, Rfl: 1    ibuprofen (MOTRIN) 600 mg tablet, TAKE 1 TABLET (600 MG TOTAL) BY MOUTH EVERY 6 (SIX) HOURS AS NEEDED FOR MILD PAIN, Disp: 60 tablet, Rfl: 0    montelukast (SINGULAIR) 10 mg tablet, TAKE 1 TABLET BY MOUTH EVERY DAY, Disp: 90 tablet, Rfl: 0    Review of Systems:  Constitutional :no fever, feels well, no tiredness, no recent weight gain or loss  ENT: no ear ache, no loss of hearing, no nosebleeds or nasal discharge, no sore throat or hoarseness  Cardiovascular: no complaints of slow or fast heart beat, no chest pain, no palpitations, no leg claudication or lower extremity edema    Respiratory: no complaints of shortness of shortness of breath, no MEYERS  Breasts:no complaints of breast pain, breast lump, or nipple discharge  Gastrointestinal: no complaints of abdominal pain, constipation, nausea, vomiting, or diarrhea or bloody stools  Genitourinary : no complaints of dysuria, incontinence, pelvic pain, no dysmenorrhea, vaginal discharge or abnormal vaginal bleeding and as noted in HPI  Musculoskeletal: no complaints of arthralgia, no myalgia, no joint swelling or stiffness, no limb pain or swelling  Integumentary: no complaints of skin rash or lesion, itching or dry skin  Neurological: no complaints of headache, no confusion, no numbness or tingling, no dizziness or fainting    Objective      /74   Ht 5' 4" (1 626 m)   Wt 99 2 kg (218 lb 12 8 oz)   LMP 09/30/2022   BMI 37 56 kg/m²     General:   appears stated age, cooperative, alert normal mood and affect   Lungs: Unlabored breathing     Breasts: normal appearance, no masses or tenderness   Abdomen: soft, non-tender, without masses or organomegaly   Vulva: normal, tiny skin tag on right labia     Vagina: normal vagina, no discharge, exudate, lesion, or erythema   Urethra: normal   Cervix: Normal, no discharge  Nontender     Uterus: normal size, contour, position, consistency, mobility, non-tender   Adnexa: no mass, fullness, tenderness   Psychiatric orientation to person, place, and time: normal  mood and affect: normal

## 2022-10-04 ENCOUNTER — APPOINTMENT (OUTPATIENT)
Dept: LAB | Facility: MEDICAL CENTER | Age: 22
End: 2022-10-04
Payer: COMMERCIAL

## 2022-10-04 DIAGNOSIS — Z11.3 SCREENING FOR STD (SEXUALLY TRANSMITTED DISEASE): ICD-10-CM

## 2022-10-04 LAB
C TRACH DNA SPEC QL NAA+PROBE: NEGATIVE
N GONORRHOEA DNA SPEC QL NAA+PROBE: NEGATIVE

## 2022-10-04 PROCEDURE — 36415 COLL VENOUS BLD VENIPUNCTURE: CPT

## 2022-10-04 PROCEDURE — 87340 HEPATITIS B SURFACE AG IA: CPT

## 2022-10-04 PROCEDURE — 86592 SYPHILIS TEST NON-TREP QUAL: CPT

## 2022-10-04 PROCEDURE — 87389 HIV-1 AG W/HIV-1&-2 AB AG IA: CPT

## 2022-10-05 LAB
HBV SURFACE AG SER QL: NORMAL
HIV 1+2 AB+HIV1 P24 AG SERPL QL IA: NORMAL
RPR SER QL: NORMAL

## 2022-10-08 DIAGNOSIS — G43.009 MIGRAINE WITHOUT AURA AND WITHOUT STATUS MIGRAINOSUS, NOT INTRACTABLE: ICD-10-CM

## 2022-10-09 LAB
LAB AP GYN PRIMARY INTERPRETATION: NORMAL
LAB AP LMP: NORMAL
Lab: NORMAL

## 2022-10-09 RX ORDER — IBUPROFEN 600 MG/1
600 TABLET ORAL EVERY 6 HOURS PRN
Qty: 60 TABLET | Refills: 0 | Status: SHIPPED | OUTPATIENT
Start: 2022-10-09

## 2022-10-12 PROBLEM — Z00.00 WELL ADULT EXAM: Status: RESOLVED | Noted: 2021-12-09 | Resolved: 2022-10-12

## 2022-10-12 PROBLEM — J03.91 RECURRENT TONSILLITIS: Status: RESOLVED | Noted: 2021-04-27 | Resolved: 2022-10-12

## 2022-10-20 ENCOUNTER — OFFICE VISIT (OUTPATIENT)
Dept: OBGYN CLINIC | Facility: CLINIC | Age: 22
End: 2022-10-20

## 2022-10-20 VITALS
SYSTOLIC BLOOD PRESSURE: 110 MMHG | WEIGHT: 215.2 LBS | HEIGHT: 65 IN | DIASTOLIC BLOOD PRESSURE: 56 MMHG | BODY MASS INDEX: 35.85 KG/M2

## 2022-10-20 DIAGNOSIS — R87.615 ENCOUNTER FOR REPEAT PAP SMEAR DUE TO PREVIOUS INSUFFICIENT CERVICAL CELLS: Primary | ICD-10-CM

## 2022-10-20 PROCEDURE — G0145 SCR C/V CYTO,THINLAYER,RESCR: HCPCS | Performed by: OBSTETRICS & GYNECOLOGY

## 2022-10-20 NOTE — PROGRESS NOTES
Carli Iqbal was seen today for gynecologic exam     Diagnoses and all orders for this visit:    Encounter for repeat Pap smear due to previous insufficient cervical cells          Abel Guillermo is a 25 y o  female here for a re pap     Patient Active Problem List   Diagnosis   • Allergic rhinitis   • Attention deficit hyperactivity disorder   • Headache, migraine   • Vitamin D deficiency   • Thyromegaly   • Right hand pain   • Rhinosinusitis   • Functional diarrhea   • Left foot pain   • Costochondritis   • Precordial pain   • Tibial pain   • Leg swelling   • Current episode of major depressive disorder without prior episode   • Pain in both hands       Gynecologic History  Patient's last menstrual period was 09/30/2022  Past Medical History:   Diagnosis Date   • ADHD    • Allergic    • Anxiety    • Depression    • Migraine      Past Surgical History:   Procedure Laterality Date   • WISDOM TOOTH EXTRACTION       History reviewed  No pertinent family history  Social History     Socioeconomic History   • Marital status: Single     Spouse name: Not on file   • Number of children: Not on file   • Years of education: Not on file   • Highest education level: Not on file   Occupational History   • Occupation:    Tobacco Use   • Smoking status: Never Smoker   • Smokeless tobacco: Never Used   Vaping Use   • Vaping Use: Every day   • Substances: Nicotine, THC, Flavoring   Substance and Sexual Activity   • Alcohol use:  Yes   • Drug use: Yes     Types: Marijuana   • Sexual activity: Yes     Partners: Male     Birth control/protection: Condom Male   Other Topics Concern   • Not on file   Social History Narrative   • Not on file     Social Determinants of Health     Financial Resource Strain: Not on file   Food Insecurity: Not on file   Transportation Needs: Not on file   Physical Activity: Not on file   Stress: Not on file   Social Connections: Not on file   Intimate Partner Violence: Not on file   Housing Stability: Not on file     Allergies   Allergen Reactions   • Kiwi Extract - Food Allergy    • Other Other (See Comments)     Cherries, sesame seed, clams, bananas  Patient has itching in throat  • Peanut Oil - Food Allergy      Elevated titer with labs       Current Outpatient Medications:   •  EPINEPHrine (EpiPen 2-Jacinto) 0 3 mg/0 3 mL SOAJ, Inject 0 3 mL (0 3 mg total) into a muscle as needed for anaphylaxis, Disp: 4 each, Rfl: 1  •  ibuprofen (MOTRIN) 600 mg tablet, TAKE 1 TABLET (600 MG TOTAL) BY MOUTH EVERY 6 (SIX) HOURS AS NEEDED FOR MILD PAIN, Disp: 60 tablet, Rfl: 0  •  montelukast (SINGULAIR) 10 mg tablet, TAKE 1 TABLET BY MOUTH EVERY DAY, Disp: 90 tablet, Rfl: 0    Review of Systems  Constitutional :no fever, feels well, no tiredness, no recent weight gain or loss  ENT: no ear ache, no loss of hearing, no nosebleeds or nasal discharge, no sore throat or hoarseness  Cardiovascular: no complaints of slow or fast heart beat, no chest pain, no palpitations, no leg claudication or lower extremity edema  Respiratory: no complaints of shortness of shortness of breath, no MEYERS  Breasts:no complaints of breast pain, breast lump, or nipple discharge  Gastrointestinal: no complaints of abdominal pain, constipation, nausea, vomiting, or diarrhea or bloody stools  Genitourinary : no complaints of dysuria, incontinence, pelvic pain, no dysmenorrhea, vaginal discharge or abnormal vaginal bleeding and as noted in HPI  Musculoskeletal: no complaints of arthralgia, no myalgia, no joint swelling or stiffness, no limb pain or swelling    Integumentary: no complaints of skin rash or lesion, itching or dry skin  Neurological: no complaints of headache, no confusion, no numbness or tingling, no dizziness or fainting     Objective     /56   Ht 5' 4 5" (1 638 m)   Wt 97 6 kg (215 lb 3 2 oz)   LMP 09/30/2022   BMI 36 37 kg/m²     General:   appears stated age, cooperative, alert normal mood and affect   Lungs: Unlabored breahting    Vulva: normal   Vagina: normal vagina, no discharge, exudate, lesion, or erythema   Urethra: normal   Cervix: Normal, no discharge  Nontender     Psychiatric orientation to person, place, and time: normal  mood and affect: normal

## 2022-10-28 LAB
LAB AP GYN PRIMARY INTERPRETATION: NORMAL
LAB AP LMP: NORMAL
Lab: NORMAL

## 2022-12-06 ENCOUNTER — OFFICE VISIT (OUTPATIENT)
Dept: FAMILY MEDICINE CLINIC | Facility: CLINIC | Age: 22
End: 2022-12-06

## 2022-12-06 VITALS
HEIGHT: 65 IN | TEMPERATURE: 97.6 F | DIASTOLIC BLOOD PRESSURE: 70 MMHG | BODY MASS INDEX: 35.32 KG/M2 | SYSTOLIC BLOOD PRESSURE: 114 MMHG | WEIGHT: 212 LBS

## 2022-12-06 DIAGNOSIS — Z23 NEED FOR IMMUNIZATION AGAINST INFLUENZA: ICD-10-CM

## 2022-12-06 DIAGNOSIS — Z00.00 WELL ADULT EXAM: Primary | ICD-10-CM

## 2022-12-06 DIAGNOSIS — F90.0 ATTENTION DEFICIT HYPERACTIVITY DISORDER (ADHD), PREDOMINANTLY INATTENTIVE TYPE: ICD-10-CM

## 2022-12-06 RX ORDER — DEXTROAMPHETAMINE SACCHARATE, AMPHETAMINE ASPARTATE MONOHYDRATE, DEXTROAMPHETAMINE SULFATE AND AMPHETAMINE SULFATE 2.5; 2.5; 2.5; 2.5 MG/1; MG/1; MG/1; MG/1
10 CAPSULE, EXTENDED RELEASE ORAL EVERY MORNING
Qty: 30 CAPSULE | Refills: 0 | Status: SHIPPED | OUTPATIENT
Start: 2022-12-06

## 2022-12-06 NOTE — PROGRESS NOTES
Assessment/Plan: Vaccines reviewed  Flu shot given at this time  Patient to have Adacel shot follow-up  Patient up-to-date with gynecologic care  Patient was offered laboratory studies  Patient will restart Adderall for ADHD  Diagnoses and all orders for this visit:    Well adult exam  -     Comprehensive metabolic panel; Future  -     CBC and differential; Future  -     Lipid panel; Future  -     TSH, 3rd generation with Free T4 reflex; Future    Need for immunization against influenza  -     FLUBLOK: influenza vaccine, quadrivalent, recombinant, PF, 0 5 mL    Attention deficit hyperactivity disorder (ADHD), predominantly inattentive type  -     amphetamine-dextroamphetamine (ADDERALL XR, 10MG,) 10 MG 24 hr capsule; Take 1 capsule (10 mg total) by mouth every morning Max Daily Amount: 10 mg            Subjective:        Patient ID: Africa Lepe is a 25 y o  female  Patient is here for wellness exam   Patient up-to-date with gynecologic care  Vaccines and laboratory studies reviewed  No early family history of colon or other cancers  The following portions of the patient's history were reviewed and updated as appropriate: allergies, current medications, past family history, past medical history, past social history, past surgical history and problem list       Review of Systems   Constitutional: Negative  HENT: Negative  Eyes: Negative  Respiratory: Negative  Cardiovascular: Negative  Gastrointestinal: Negative  Endocrine: Negative  Genitourinary: Negative  Musculoskeletal: Negative  Skin: Negative  Allergic/Immunologic: Negative  Neurological: Negative  Hematological: Negative  Psychiatric/Behavioral: Positive for decreased concentration             Objective:               /70 (BP Location: Right arm, Patient Position: Sitting, Cuff Size: Large)   Temp 97 6 °F (36 4 °C)   Ht 5' 4 5" (1 638 m)   Wt 96 2 kg (212 lb)   BMI 35 83 kg/m² Physical Exam  Vitals and nursing note reviewed  Constitutional:       General: She is not in acute distress  Appearance: Normal appearance  She is not ill-appearing, toxic-appearing or diaphoretic  HENT:      Head: Normocephalic and atraumatic  Right Ear: Tympanic membrane, ear canal and external ear normal  There is no impacted cerumen  Left Ear: Tympanic membrane, ear canal and external ear normal  There is no impacted cerumen  Nose: Nose normal  No congestion or rhinorrhea  Mouth/Throat:      Mouth: Mucous membranes are moist       Pharynx: No oropharyngeal exudate or posterior oropharyngeal erythema  Eyes:      General: No scleral icterus  Right eye: No discharge  Left eye: No discharge  Extraocular Movements: Extraocular movements intact  Conjunctiva/sclera: Conjunctivae normal       Pupils: Pupils are equal, round, and reactive to light  Neck:      Vascular: No carotid bruit  Cardiovascular:      Rate and Rhythm: Normal rate and regular rhythm  Pulses: Normal pulses  Heart sounds: Normal heart sounds  No murmur heard  No friction rub  No gallop  Pulmonary:      Effort: Pulmonary effort is normal  No respiratory distress  Breath sounds: Normal breath sounds  No stridor  No wheezing, rhonchi or rales  Chest:      Chest wall: No tenderness  Musculoskeletal:         General: No swelling, tenderness, deformity or signs of injury  Normal range of motion  Cervical back: Normal range of motion and neck supple  No rigidity  No muscular tenderness  Right lower leg: No edema  Left lower leg: No edema  Lymphadenopathy:      Cervical: No cervical adenopathy  Skin:     General: Skin is warm and dry  Capillary Refill: Capillary refill takes less than 2 seconds  Coloration: Skin is not jaundiced  Findings: No bruising, erythema, lesion or rash     Neurological:      Mental Status: She is alert and oriented to person, place, and time  Mental status is at baseline  Cranial Nerves: No cranial nerve deficit  Sensory: No sensory deficit  Motor: No weakness  Coordination: Coordination normal       Gait: Gait normal    Psychiatric:         Mood and Affect: Mood normal          Behavior: Behavior normal          Thought Content:  Thought content normal          Judgment: Judgment normal

## 2023-02-04 PROBLEM — Z00.00 WELL ADULT EXAM: Status: RESOLVED | Noted: 2022-12-06 | Resolved: 2023-02-04

## 2023-02-13 ENCOUNTER — TELEPHONE (OUTPATIENT)
Dept: FAMILY MEDICINE CLINIC | Facility: CLINIC | Age: 23
End: 2023-02-13

## 2023-02-13 DIAGNOSIS — F90.0 ATTENTION DEFICIT HYPERACTIVITY DISORDER (ADHD), PREDOMINANTLY INATTENTIVE TYPE: Primary | ICD-10-CM

## 2023-02-13 NOTE — TELEPHONE ENCOUNTER
In the process of verifying the patient's insurance with the pharmacy for authorization, the pharmacist informed me the Adderall 10mg's are on back order (most of the adderalls are he said)  Do you wish me to still try for authorization of the adderall or do you want to change the medication?

## 2023-02-13 NOTE — TELEPHONE ENCOUNTER
Pt will call tomorrow and let us know if she wants to try one of the other meds [facility] suggested

## 2023-02-16 NOTE — TELEPHONE ENCOUNTER
Patient is willing to start vyvanse  Please advise dosage and patient would like it to go to [facility] [address]

## 2023-02-17 ENCOUNTER — TELEPHONE (OUTPATIENT)
Dept: FAMILY MEDICINE CLINIC | Facility: CLINIC | Age: 23
End: 2023-02-17

## 2023-02-17 NOTE — TELEPHONE ENCOUNTER
Pharmacy notified the office an Juliette Mckinnon is needed for vyvanse  Auth initiated through [facility]      Key: [id number]

## 2023-02-20 NOTE — TELEPHONE ENCOUNTER
[facility] denied the auth for Vyvanse as documentation was not provided that possible risks of misuse, abuse and addictions were assessed along with possible side effects and risks of taking this type of medication were not discussed with the patient  Additional information can be sent to the insurance if these parameters have been met

## 2023-02-22 DIAGNOSIS — J45.909 ASTHMA DUE TO ENVIRONMENTAL ALLERGIES: ICD-10-CM

## 2023-02-22 RX ORDER — MONTELUKAST SODIUM 10 MG/1
TABLET ORAL
Qty: 90 TABLET | Refills: 0 | Status: SHIPPED | OUTPATIENT
Start: 2023-02-22

## 2023-02-28 ENCOUNTER — OFFICE VISIT (OUTPATIENT)
Dept: FAMILY MEDICINE CLINIC | Facility: CLINIC | Age: 23
End: 2023-02-28

## 2023-02-28 ENCOUNTER — APPOINTMENT (OUTPATIENT)
Dept: RADIOLOGY | Facility: MEDICAL CENTER | Age: 23
End: 2023-02-28

## 2023-02-28 ENCOUNTER — APPOINTMENT (OUTPATIENT)
Dept: LAB | Facility: MEDICAL CENTER | Age: 23
End: 2023-02-28

## 2023-02-28 VITALS
BODY MASS INDEX: 35.25 KG/M2 | HEART RATE: 83 BPM | TEMPERATURE: 98.3 F | WEIGHT: 211.6 LBS | DIASTOLIC BLOOD PRESSURE: 72 MMHG | SYSTOLIC BLOOD PRESSURE: 118 MMHG | OXYGEN SATURATION: 98 % | HEIGHT: 65 IN

## 2023-02-28 DIAGNOSIS — M25.562 ACUTE PAIN OF LEFT KNEE: ICD-10-CM

## 2023-02-28 DIAGNOSIS — M25.512 CHRONIC LEFT SHOULDER PAIN: ICD-10-CM

## 2023-02-28 DIAGNOSIS — G89.29 CHRONIC LEFT SHOULDER PAIN: ICD-10-CM

## 2023-02-28 DIAGNOSIS — M25.562 ACUTE PAIN OF LEFT KNEE: Primary | ICD-10-CM

## 2023-02-28 DIAGNOSIS — Z00.00 WELL ADULT EXAM: ICD-10-CM

## 2023-02-28 DIAGNOSIS — E66.9 OBESITY (BMI 30-39.9): ICD-10-CM

## 2023-02-28 DIAGNOSIS — G43.009 MIGRAINE WITHOUT AURA AND WITHOUT STATUS MIGRAINOSUS, NOT INTRACTABLE: ICD-10-CM

## 2023-02-28 DIAGNOSIS — K21.00 GASTROESOPHAGEAL REFLUX DISEASE WITH ESOPHAGITIS WITHOUT HEMORRHAGE: Primary | ICD-10-CM

## 2023-02-28 LAB
ALBUMIN SERPL BCP-MCNC: 4.2 G/DL (ref 3.5–5)
ALP SERPL-CCNC: 82 U/L (ref 46–116)
ALT SERPL W P-5'-P-CCNC: 18 U/L (ref 12–78)
ANION GAP SERPL CALCULATED.3IONS-SCNC: 4 MMOL/L (ref 4–13)
AST SERPL W P-5'-P-CCNC: 12 U/L (ref 5–45)
BASOPHILS # BLD AUTO: 0.03 THOUSANDS/ÂΜL (ref 0–0.1)
BASOPHILS NFR BLD AUTO: 0 % (ref 0–1)
BILIRUB SERPL-MCNC: 0.34 MG/DL (ref 0.2–1)
BUN SERPL-MCNC: 8 MG/DL (ref 5–25)
CALCIUM SERPL-MCNC: 9.7 MG/DL (ref 8.3–10.1)
CHLORIDE SERPL-SCNC: 111 MMOL/L (ref 96–108)
CHOLEST SERPL-MCNC: 139 MG/DL
CO2 SERPL-SCNC: 24 MMOL/L (ref 21–32)
CREAT SERPL-MCNC: 0.77 MG/DL (ref 0.6–1.3)
EOSINOPHIL # BLD AUTO: 0.33 THOUSAND/ÂΜL (ref 0–0.61)
EOSINOPHIL NFR BLD AUTO: 3 % (ref 0–6)
ERYTHROCYTE [DISTWIDTH] IN BLOOD BY AUTOMATED COUNT: 13.3 % (ref 11.6–15.1)
GFR SERPL CREATININE-BSD FRML MDRD: 109 ML/MIN/1.73SQ M
GLUCOSE P FAST SERPL-MCNC: 97 MG/DL (ref 65–99)
HCT VFR BLD AUTO: 36.8 % (ref 34.8–46.1)
HDLC SERPL-MCNC: 39 MG/DL
HGB BLD-MCNC: 11.8 G/DL (ref 11.5–15.4)
IMM GRANULOCYTES # BLD AUTO: 0.06 THOUSAND/UL (ref 0–0.2)
IMM GRANULOCYTES NFR BLD AUTO: 1 % (ref 0–2)
LDLC SERPL CALC-MCNC: 87 MG/DL (ref 0–100)
LYMPHOCYTES # BLD AUTO: 2.29 THOUSANDS/ÂΜL (ref 0.6–4.47)
LYMPHOCYTES NFR BLD AUTO: 24 % (ref 14–44)
MCH RBC QN AUTO: 26.6 PG (ref 26.8–34.3)
MCHC RBC AUTO-ENTMCNC: 32.1 G/DL (ref 31.4–37.4)
MCV RBC AUTO: 83 FL (ref 82–98)
MONOCYTES # BLD AUTO: 0.94 THOUSAND/ÂΜL (ref 0.17–1.22)
MONOCYTES NFR BLD AUTO: 10 % (ref 4–12)
NEUTROPHILS # BLD AUTO: 6.09 THOUSANDS/ÂΜL (ref 1.85–7.62)
NEUTS SEG NFR BLD AUTO: 62 % (ref 43–75)
NONHDLC SERPL-MCNC: 100 MG/DL
NRBC BLD AUTO-RTO: 0 /100 WBCS
PLATELET # BLD AUTO: 373 THOUSANDS/UL (ref 149–390)
PMV BLD AUTO: 9.3 FL (ref 8.9–12.7)
POTASSIUM SERPL-SCNC: 4.1 MMOL/L (ref 3.5–5.3)
PROT SERPL-MCNC: 7.7 G/DL (ref 6.4–8.4)
RBC # BLD AUTO: 4.43 MILLION/UL (ref 3.81–5.12)
SODIUM SERPL-SCNC: 139 MMOL/L (ref 135–147)
TRIGL SERPL-MCNC: 65 MG/DL
TSH SERPL DL<=0.05 MIU/L-ACNC: 0.5 UIU/ML (ref 0.45–4.5)
WBC # BLD AUTO: 9.74 THOUSAND/UL (ref 4.31–10.16)

## 2023-02-28 RX ORDER — IBUPROFEN 600 MG/1
600 TABLET ORAL EVERY 6 HOURS PRN
Qty: 60 TABLET | Refills: 0 | Status: SHIPPED | OUTPATIENT
Start: 2023-02-28

## 2023-02-28 RX ORDER — OMEPRAZOLE 40 MG/1
40 CAPSULE, DELAYED RELEASE ORAL
Qty: 90 CAPSULE | Refills: 2 | Status: SHIPPED | OUTPATIENT
Start: 2023-02-28

## 2023-02-28 NOTE — TELEPHONE ENCOUNTER
----- Message from Karen Bellamy DO sent at 2/28/2023  4:50 PM EST -----  Regarding: FW: I forgot  Contact: [phone]  Call patient to see if she is having any reflux symptoms at night  Try omeprazole 40 mg daily #30 with 2 refills  Further work-up if night sweats persist  ----- Message -----  From: Yosi zAevedo MA  Sent: 2/28/2023  12:48 PM EST  To: Karen Bellamy DO  Subject: RE: I forgot                                     See pt's note    ----- Message -----  From: Mary Negron  Sent: 2/28/2023  12:27 PM EST  To: [facility]  Subject: I forgot                                         Hello,   So sorry I forgot during my appointment the real main reason I even made the appointment  When I sleep no matter the temperature I wake up drenched in sweat, if it’s cold in my room I’m wet & freezing  But I was hoping to speak with you about it because it’s an every night issue & it’s really bad

## 2023-02-28 NOTE — TELEPHONE ENCOUNTER
----- Message from Shiva Ramirez DO sent at 2/28/2023  4:50 PM EST -----  Regarding: FW: I forgot  Contact: [phone]  Call patient to see if she is having any reflux symptoms at night  Try omeprazole 40 mg daily #30 with 2 refills  Further work-up if night sweats persist  ----- Message -----  From: Herbert Leblanc MA  Sent: 2/28/2023  12:48 PM EST  To: Shiva Ramirez DO  Subject: RE: I forgot                                     See pt's note    ----- Message -----  From: Luisana Hobson  Sent: 2/28/2023  12:27 PM EST  To: [facility]  Subject: I forgot                                         Hello,   So sorry I forgot during my appointment the real main reason I even made the appointment  When I sleep no matter the temperature I wake up drenched in sweat, if it’s cold in my room I’m wet & freezing  But I was hoping to speak with you about it because it’s an every night issue & it’s really bad

## 2023-03-17 ENCOUNTER — PATIENT MESSAGE (OUTPATIENT)
Dept: FAMILY MEDICINE CLINIC | Facility: CLINIC | Age: 23
End: 2023-03-17

## 2023-04-27 ENCOUNTER — OFFICE VISIT (OUTPATIENT)
Dept: FAMILY MEDICINE CLINIC | Facility: CLINIC | Age: 23
End: 2023-04-27

## 2023-04-27 VITALS
HEIGHT: 65 IN | RESPIRATION RATE: 16 BRPM | WEIGHT: 211 LBS | HEART RATE: 82 BPM | OXYGEN SATURATION: 99 % | DIASTOLIC BLOOD PRESSURE: 80 MMHG | SYSTOLIC BLOOD PRESSURE: 122 MMHG | TEMPERATURE: 97.7 F | BODY MASS INDEX: 35.16 KG/M2

## 2023-04-27 DIAGNOSIS — H61.22 EXCESSIVE CERUMEN IN LEFT EAR CANAL: Primary | ICD-10-CM

## 2023-04-27 NOTE — PROGRESS NOTES
Assessment/Plan:      Diagnoses and all orders for this visit:    Excessive cerumen in left ear canal  -     Ear cerumen removal        Subjective:     Patient ID: Rafael Pak is a 26 y o  female  Patient presents with:  Earache: Patient is complaining of an earache and discomfort for about a week  Sore Throat: Patient is complaining of a mild sore throat and tonsils  Review of Systems   Constitutional: Negative  HENT: Positive for congestion, sneezing and sore throat  Negative for sinus pressure and sinus pain  Eyes: Negative  Respiratory: Negative  Cardiovascular: Negative  Gastrointestinal: Negative  Endocrine: Negative  Genitourinary: Negative  Musculoskeletal: Negative  Skin: Negative  Allergic/Immunologic: Negative  Neurological: Negative  Hematological: Negative  Psychiatric/Behavioral: Negative  All other systems reviewed and are negative  Objective:     Physical Exam  Vitals and nursing note reviewed  Constitutional:       Appearance: She is well-developed  HENT:      Head: Normocephalic and atraumatic  Right Ear: External ear normal       Left Ear: External ear normal       Nose: Nose normal    Eyes:      Conjunctiva/sclera: Conjunctivae normal       Pupils: Pupils are equal, round, and reactive to light  Cardiovascular:      Rate and Rhythm: Normal rate and regular rhythm  Heart sounds: Normal heart sounds  Pulmonary:      Effort: Pulmonary effort is normal       Breath sounds: Normal breath sounds  Abdominal:      General: Bowel sounds are normal       Palpations: Abdomen is soft  Musculoskeletal:         General: Normal range of motion  Cervical back: Normal range of motion and neck supple  Skin:     General: Skin is warm and dry  Neurological:      Mental Status: She is alert and oriented to person, place, and time  Deep Tendon Reflexes: Reflexes are normal and symmetric     Psychiatric: Behavior: Behavior normal        Ear cerumen removal    Date/Time: 4/27/2023 12:45 PM  Performed by: Eneida Nunez DO  Authorized by: Eneida Nunez DO     Patient location:  Clinic  Procedure details:     Location:  L ear    Procedure type: irrigation only    Post-procedure details:     Complication:  None    Hearing quality:  Normal    Patient tolerance of procedure:   Tolerated well, no immediate complications

## 2023-05-01 ENCOUNTER — TELEPHONE (OUTPATIENT)
Dept: FAMILY MEDICINE CLINIC | Facility: CLINIC | Age: 23
End: 2023-05-01

## 2023-05-16 ENCOUNTER — TELEPHONE (OUTPATIENT)
Dept: OBGYN CLINIC | Facility: MEDICAL CENTER | Age: 23
End: 2023-05-16

## 2023-05-17 ENCOUNTER — TELEPHONE (OUTPATIENT)
Dept: OBGYN CLINIC | Facility: MEDICAL CENTER | Age: 23
End: 2023-05-17

## 2023-05-18 ENCOUNTER — APPOINTMENT (OUTPATIENT)
Dept: LAB | Facility: CLINIC | Age: 23
End: 2023-05-18

## 2023-05-18 ENCOUNTER — OFFICE VISIT (OUTPATIENT)
Dept: OBGYN CLINIC | Facility: CLINIC | Age: 23
End: 2023-05-18

## 2023-05-18 VITALS
WEIGHT: 220 LBS | SYSTOLIC BLOOD PRESSURE: 110 MMHG | DIASTOLIC BLOOD PRESSURE: 70 MMHG | BODY MASS INDEX: 36.65 KG/M2 | HEIGHT: 65 IN

## 2023-05-18 DIAGNOSIS — Z11.3 SCREEN FOR STD (SEXUALLY TRANSMITTED DISEASE): Primary | ICD-10-CM

## 2023-05-18 DIAGNOSIS — Z11.3 SCREEN FOR STD (SEXUALLY TRANSMITTED DISEASE): ICD-10-CM

## 2023-05-18 LAB
HBV SURFACE AG SER QL: NORMAL
HIV 1+2 AB+HIV1 P24 AG SERPL QL IA: NORMAL
HIV 2 AB SERPL QL IA: NORMAL
HIV1 AB SERPL QL IA: NORMAL
HIV1 P24 AG SERPL QL IA: NORMAL
TREPONEMA PALLIDUM IGG+IGM AB [PRESENCE] IN SERUM OR PLASMA BY IMMUNOASSAY: NORMAL

## 2023-05-18 NOTE — PROGRESS NOTES
"[facility] [address], [address], [place], [place]    Assessment/Plan:  No problem-specific Assessment & Plan notes found for this encounter  Diagnoses and all orders for this visit:    Screen for STD (sexually transmitted disease)  -     HSV TYPE 1,2 DNA PCR [facility] ONLY  -     Chlamydia/GC amplified DNA by PCR  -     RPR-Syphilis Screening (Total Syphilis IGG/IGM); Future  -     Hepatitis B surface antigen; Future  -     HIV 1/2 AG/AB w Reflex [facility] for 2 yr old and above; Future  -     Herpes I/II IgG SVITLANA w Reflex to HSV-2; Future          Subjective: Saji Rosales is a 26 y o  Jannell Bring female  CC: for STD testing    HPI: HPI  Patient presents for STD testing after having an unprotected sexual encounter  She reports irritation around the clitoris and possible laceration  No abnormal discharge, vaginal odor, or burning  Recently had her cycle  ROS: Review of Systems   Constitutional: Negative  Respiratory: Negative  Cardiovascular: Negative  Gastrointestinal: Negative  Genitourinary: Negative  Musculoskeletal: Negative  All other systems reviewed and are negative  PFSH: The following portions of the patient's history were reviewed and updated as appropriate: allergies, current medications, past family history, past medical history, obstetric history, gynecologic history, past social history, past surgical history and problem list        Objective:  /70 (BP Location: Left arm)   Ht 5' 4 5\" (1 638 m)   Wt 99 8 kg (220 lb)   LMP 05/17/2023   BMI 37 18 kg/m²    Physical Exam  Vitals reviewed  Constitutional:       Appearance: Normal appearance  Cardiovascular:      Rate and Rhythm: Normal rate  Pulmonary:      Effort: Pulmonary effort is normal  No respiratory distress  Genitourinary:     General: Normal vulva  Exam position: Lithotomy position  Labia:         Right: No lesion  Left: No lesion         Vagina: Bleeding " present  Cervix: Normal    Neurological:      Mental Status: She is alert     Psychiatric:         Mood and Affect: Mood normal          Behavior: Behavior normal

## 2023-05-19 LAB
C TRACH DNA SPEC QL NAA+PROBE: NEGATIVE
HSV1 DNA SPEC QL NAA+PROBE: NOT DETECTED
HSV1 DNA SPEC QL NAA+PROBE: NOT DETECTED
HSV1 IGG SER IA-ACNC: 10.4 INDEX (ref 0–0.9)
HSV2 IGG SER IA-ACNC: 8.37 INDEX (ref 0–0.9)
N GONORRHOEA DNA SPEC QL NAA+PROBE: NEGATIVE

## 2023-05-22 ENCOUNTER — TELEPHONE (OUTPATIENT)
Dept: OBGYN CLINIC | Facility: MEDICAL CENTER | Age: 23
End: 2023-05-22

## 2023-05-22 DIAGNOSIS — N90.89 VULVAR IRRITATION: Primary | ICD-10-CM

## 2023-05-22 RX ORDER — VALACYCLOVIR HYDROCHLORIDE 1 G/1
1000 TABLET, FILM COATED ORAL 2 TIMES DAILY
Qty: 20 TABLET | Refills: 0 | Status: SHIPPED | OUTPATIENT
Start: 2023-05-22 | End: 2023-06-02 | Stop reason: SDUPTHER

## 2023-05-22 NOTE — TELEPHONE ENCOUNTER
Patient called about her test results that came back abnormal and would like to review them  Please review when you get a chance   Thank you

## 2023-05-25 DIAGNOSIS — J45.909 ASTHMA DUE TO ENVIRONMENTAL ALLERGIES: ICD-10-CM

## 2023-05-25 RX ORDER — MONTELUKAST SODIUM 10 MG/1
TABLET ORAL
Qty: 90 TABLET | Refills: 1 | Status: SHIPPED | OUTPATIENT
Start: 2023-05-25

## 2023-06-02 ENCOUNTER — TELEPHONE (OUTPATIENT)
Dept: OBGYN CLINIC | Facility: MEDICAL CENTER | Age: 23
End: 2023-06-02

## 2023-06-02 DIAGNOSIS — N90.89 VULVAR IRRITATION: ICD-10-CM

## 2023-06-02 RX ORDER — VALACYCLOVIR HYDROCHLORIDE 1 G/1
1000 TABLET, FILM COATED ORAL 2 TIMES DAILY
Qty: 20 TABLET | Refills: 0 | Status: SHIPPED | OUTPATIENT
Start: 2023-06-02 | End: 2023-06-12

## 2023-06-02 NOTE — TELEPHONE ENCOUNTER
Patient called with some concerns about returning symptoms and would like to speak to someone  Patient states that she was taking the valtrex but did miss a dose and now believes that they are returning and getting worse  Please review when you get a chance   Thank you

## 2023-06-02 NOTE — TELEPHONE ENCOUNTER
PT mother would like a call back to discuss questions she has about daughter diagnosis  Mother is on communication consent   She could be reached at [phone]

## 2023-06-08 ENCOUNTER — TELEPHONE (OUTPATIENT)
Dept: OBGYN CLINIC | Facility: MEDICAL CENTER | Age: 23
End: 2023-06-08

## 2023-06-08 NOTE — TELEPHONE ENCOUNTER
Patient is still experiencing some bumps in vaginal area, states that blisters are starting to form. Patient has taken valtrex for a couple days now but believes that it is getting worse. Please review when you get a chance.  Thank you

## 2023-06-12 ENCOUNTER — OFFICE VISIT (OUTPATIENT)
Dept: FAMILY MEDICINE CLINIC | Facility: CLINIC | Age: 23
End: 2023-06-12
Payer: COMMERCIAL

## 2023-06-12 VITALS
TEMPERATURE: 97.5 F | DIASTOLIC BLOOD PRESSURE: 80 MMHG | HEIGHT: 65 IN | BODY MASS INDEX: 35.49 KG/M2 | OXYGEN SATURATION: 97 % | HEART RATE: 76 BPM | WEIGHT: 213 LBS | SYSTOLIC BLOOD PRESSURE: 116 MMHG

## 2023-06-12 DIAGNOSIS — M54.16 LUMBAR RADICULITIS: ICD-10-CM

## 2023-06-12 DIAGNOSIS — G43.009 MIGRAINE WITHOUT AURA AND WITHOUT STATUS MIGRAINOSUS, NOT INTRACTABLE: ICD-10-CM

## 2023-06-12 DIAGNOSIS — J30.1 SEASONAL ALLERGIC RHINITIS DUE TO POLLEN: Primary | ICD-10-CM

## 2023-06-12 DIAGNOSIS — F90.0 ATTENTION DEFICIT HYPERACTIVITY DISORDER (ADHD), PREDOMINANTLY INATTENTIVE TYPE: ICD-10-CM

## 2023-06-12 PROCEDURE — 99214 OFFICE O/P EST MOD 30 MIN: CPT | Performed by: FAMILY MEDICINE

## 2023-06-12 RX ORDER — IBUPROFEN 600 MG/1
600 TABLET ORAL EVERY 6 HOURS PRN
Qty: 60 TABLET | Refills: 0 | Status: SHIPPED | OUTPATIENT
Start: 2023-06-12

## 2023-06-12 NOTE — PROGRESS NOTES
Assessment/Plan: Patient will try to quit vaping  Patient will continue with Prilosec for GERD, cough  Patient will go for x-ray due to lumbar radiculitis  Patient to start physical therapy  Diagnoses and all orders for this visit:    Seasonal allergic rhinitis due to pollen    Migraine without aura and without status migrainosus, not intractable  -     ibuprofen (MOTRIN) 600 mg tablet; Take 1 tablet (600 mg total) by mouth every 6 (six) hours as needed for mild pain    Lumbar radiculitis  -     XR spine lumbar minimum 4 views non injury; Future  -     Ambulatory Referral to Physical Therapy; Future    Attention deficit hyperactivity disorder (ADHD), predominantly inattentive type  -     lisdexamfetamine (Vyvanse) 30 MG capsule; Take 1 capsule (30 mg total) by mouth every morning Max Daily Amount: 30 mg            Subjective:        Patient ID: Michael Sharpe is a 26 y o  female  Patient is here with cough for the past month  Patient with some clear to yellow sputum production  Patient also with some rhinorrhea postnasal drip  No fevers  Again earache sore throat or headache associated with this  Some wheezing noted  Cough is improving  Patient just restarted omeprazole  Patient also with some pain left thigh and knee  No significant back pain associated with this  Patient to use ice for her knee  This occurs mostly at work when sitting  Patient also has pain in the left buttocks  The following portions of the patient's history were reviewed and updated as appropriate: allergies, current medications, past family history, past medical history, past social history, past surgical history and problem list       Review of Systems   Constitutional: Negative  HENT: Positive for postnasal drip and rhinorrhea  Eyes: Negative  Respiratory: Positive for cough  Cardiovascular: Negative  Gastrointestinal: Negative  Endocrine: Negative  Genitourinary: Negative  "  Musculoskeletal: Positive for arthralgias  Skin: Negative  Allergic/Immunologic: Negative  Neurological: Negative  Hematological: Negative  Psychiatric/Behavioral: Negative  Objective:               /80 (BP Location: Right arm, Patient Position: Sitting, Cuff Size: Large)   Pulse 76   Temp 97 5 °F (36 4 °C) (Temporal)   Ht 5' 4 5\" (1 638 m)   Wt 96 6 kg (213 lb)   LMP 05/17/2023   SpO2 97%   BMI 36 00 kg/m²          Physical Exam  Vitals and nursing note reviewed  Constitutional:       General: She is not in acute distress  Appearance: Normal appearance  She is not ill-appearing, toxic-appearing or diaphoretic  HENT:      Head: Normocephalic and atraumatic  Right Ear: Tympanic membrane, ear canal and external ear normal  There is no impacted cerumen  Left Ear: Tympanic membrane, ear canal and external ear normal  There is no impacted cerumen  Nose: Nose normal  No congestion or rhinorrhea  Mouth/Throat:      Mouth: Mucous membranes are moist       Pharynx: No oropharyngeal exudate or posterior oropharyngeal erythema  Eyes:      General: No scleral icterus  Right eye: No discharge  Left eye: No discharge  Extraocular Movements: Extraocular movements intact  Conjunctiva/sclera: Conjunctivae normal       Pupils: Pupils are equal, round, and reactive to light  Neck:      Vascular: No carotid bruit  Cardiovascular:      Rate and Rhythm: Normal rate and regular rhythm  Pulses: Normal pulses  Heart sounds: Normal heart sounds  No murmur heard  No friction rub  No gallop  Pulmonary:      Effort: Pulmonary effort is normal  No respiratory distress  Breath sounds: Normal breath sounds  No stridor  No wheezing, rhonchi or rales  Chest:      Chest wall: No tenderness  Musculoskeletal:         General: Tenderness present  No swelling, deformity or signs of injury        Cervical back: Normal range of motion " and neck supple  No rigidity  No muscular tenderness  Right lower leg: No edema  Left lower leg: No edema  Lymphadenopathy:      Cervical: No cervical adenopathy  Skin:     General: Skin is warm and dry  Capillary Refill: Capillary refill takes less than 2 seconds  Coloration: Skin is not jaundiced  Findings: No bruising, erythema, lesion or rash  Neurological:      Mental Status: She is alert and oriented to person, place, and time  Mental status is at baseline  Cranial Nerves: No cranial nerve deficit  Sensory: No sensory deficit  Motor: No weakness  Coordination: Coordination normal       Gait: Gait normal    Psychiatric:         Mood and Affect: Mood normal          Behavior: Behavior normal          Thought Content:  Thought content normal          Judgment: Judgment normal

## 2023-06-13 ENCOUNTER — APPOINTMENT (OUTPATIENT)
Dept: RADIOLOGY | Facility: MEDICAL CENTER | Age: 23
End: 2023-06-13
Payer: COMMERCIAL

## 2023-06-13 DIAGNOSIS — M54.16 LUMBAR RADICULITIS: ICD-10-CM

## 2023-06-13 PROCEDURE — 72110 X-RAY EXAM L-2 SPINE 4/>VWS: CPT

## 2023-06-14 ENCOUNTER — OFFICE VISIT (OUTPATIENT)
Dept: OBGYN CLINIC | Facility: CLINIC | Age: 23
End: 2023-06-14
Payer: COMMERCIAL

## 2023-06-14 VITALS
DIASTOLIC BLOOD PRESSURE: 80 MMHG | BODY MASS INDEX: 37.56 KG/M2 | HEIGHT: 64 IN | WEIGHT: 220 LBS | SYSTOLIC BLOOD PRESSURE: 122 MMHG

## 2023-06-14 DIAGNOSIS — N90.89 VULVAR LESION: Primary | ICD-10-CM

## 2023-06-14 PROCEDURE — 88312 SPECIAL STAINS GROUP 1: CPT | Performed by: PATHOLOGY

## 2023-06-14 PROCEDURE — 17110 DESTRUCTION B9 LES UP TO 14: CPT | Performed by: OBSTETRICS & GYNECOLOGY

## 2023-06-14 PROCEDURE — 99213 OFFICE O/P EST LOW 20 MIN: CPT | Performed by: OBSTETRICS & GYNECOLOGY

## 2023-06-14 PROCEDURE — 88342 IMHCHEM/IMCYTCHM 1ST ANTB: CPT | Performed by: PATHOLOGY

## 2023-06-14 PROCEDURE — 88344 IMHCHEM/IMCYTCHM EA MLT ANTB: CPT | Performed by: PATHOLOGY

## 2023-06-14 PROCEDURE — 88341 IMHCHEM/IMCYTCHM EA ADD ANTB: CPT | Performed by: PATHOLOGY

## 2023-06-14 PROCEDURE — 88305 TISSUE EXAM BY PATHOLOGIST: CPT | Performed by: PATHOLOGY

## 2023-06-14 NOTE — PROGRESS NOTES
"Lesion Destruction    Date/Time: 6/14/2023 3:00 PM    Performed by: Mone Barboza MD  Authorized by: Mone Barboza MD  Universal Protocol:  Consent: Verbal consent obtained  Written consent obtained  Risks and benefits: risks, benefits and alternatives were discussed  Consent given by: patient  Time out: Immediately prior to procedure a \"time out\" was called to verify the correct patient, procedure, equipment, support staff and site/side marked as required  Patient understanding: patient states understanding of the procedure being performed  Patient consent: the patient's understanding of the procedure matches consent given  Patient identity confirmed: verbally with patient      Procedure Details - Lesion Destruction:     Number of Lesions:  1  Lesion 1:     Body area:   Anogenital    Anogenital location:  Vulva    Initial size (mm):  4    Final defect size (mm):  4    Malignancy: benign lesion      Destruction method: surgical removal      Extensive destruction required?: No       Duncan punch biopsy performed 4mm  Area was  Cleaned with betadine x 2   2 ml of lidocaine applied at base of vulvar lesion and biopsy performed         "

## 2023-06-16 ENCOUNTER — TELEPHONE (OUTPATIENT)
Dept: OBGYN CLINIC | Facility: MEDICAL CENTER | Age: 23
End: 2023-06-16

## 2023-06-16 NOTE — TELEPHONE ENCOUNTER
Pt has been of her valtrex medication since Monday pt state her vaginal lesion is getting worse very uncomfortable  Her biopsy still in process  She wants to know what can she do to help w the pain

## 2023-06-20 PROCEDURE — 88341 IMHCHEM/IMCYTCHM EA ADD ANTB: CPT | Performed by: PATHOLOGY

## 2023-06-20 PROCEDURE — 88305 TISSUE EXAM BY PATHOLOGIST: CPT | Performed by: PATHOLOGY

## 2023-06-20 PROCEDURE — 88344 IMHCHEM/IMCYTCHM EA MLT ANTB: CPT | Performed by: PATHOLOGY

## 2023-06-20 PROCEDURE — 88342 IMHCHEM/IMCYTCHM 1ST ANTB: CPT | Performed by: PATHOLOGY

## 2023-06-20 PROCEDURE — 88312 SPECIAL STAINS GROUP 1: CPT | Performed by: PATHOLOGY

## 2023-06-27 ENCOUNTER — OFFICE VISIT (OUTPATIENT)
Dept: OBGYN CLINIC | Facility: MEDICAL CENTER | Age: 23
End: 2023-06-27
Payer: COMMERCIAL

## 2023-06-27 VITALS
DIASTOLIC BLOOD PRESSURE: 70 MMHG | BODY MASS INDEX: 36.84 KG/M2 | WEIGHT: 215.8 LBS | HEIGHT: 64 IN | SYSTOLIC BLOOD PRESSURE: 124 MMHG

## 2023-06-27 DIAGNOSIS — L73.2 HYDRADENITIS: ICD-10-CM

## 2023-06-27 DIAGNOSIS — L30.8 PSORIASIFORM DERMATITIS: Primary | ICD-10-CM

## 2023-06-27 PROCEDURE — 99213 OFFICE O/P EST LOW 20 MIN: CPT | Performed by: OBSTETRICS & GYNECOLOGY

## 2023-06-28 ENCOUNTER — TELEPHONE (OUTPATIENT)
Dept: FAMILY MEDICINE CLINIC | Facility: CLINIC | Age: 23
End: 2023-06-28

## 2023-06-28 RX ORDER — CLOBETASOL PROPIONATE 0.5 MG/G
CREAM TOPICAL 2 TIMES DAILY
Qty: 15 G | Refills: 0 | Status: SHIPPED | OUTPATIENT
Start: 2023-06-28

## 2023-06-28 NOTE — PROGRESS NOTES
Kathy Manuel was seen today for follow-up  Diagnoses and all orders for this visit:    Psoriasiform dermatitis  -     Ambulatory Referral to Dermatology; Future  -     clobetasol (TEMOVATE) 0 05 % cream; Apply topically 2 (two) times a day Apply to affected area if lesion present    Hydradenitis  -     Ambulatory Referral to Dermatology; Future  -     clobetasol (TEMOVATE) 0 05 % cream; Apply topically 2 (two) times a day Apply to affected area if lesion present    A Vulva, vulvar biopsy    Addendum  Spirochete (Treponema pallidum) immunostain performed and interpreted (Dr Teresita Tobias) at [facility] (Specimen ID: [id number]) is  negative  A copy of the addendum is sent to Dr Lawrance Cheadle on 6/21/23 @ 1450 hours  Addendum electronically signed by Marielos Swain MD on 6/21/2023 at 1450    Final Diagnosis  A  Vulva, vulvar biopsy:  - 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/pustules  See Note  - Special stains for fungus (PAS), bacteria (B&H) and spirochetes (Warthin-Starry) negative  -- Spirochete immunostain pending; result will be reported in an addendum   - No viral inclusions; HSV 1 & 2 immunostain negative  - Negative for squamous intraepithelial lesion and malignancy on multiple examined levels  -- Pankeratin (CKAE1/3) immunostain confirms no invasive carcinoma  -- Confirmed by p53 (normal wild type expression) and negative p16/Ki-67 (basilar/suprabasal  proliferation) immunostains  today we reviewed pathology from vulvar biopsy  , we discussed trial of steroid cream to affected area if lesion returns /  I did refer her to dermatology for follow up as well as secondary to hydradenitis      Subjective   Jaquan Beck is a 26 y o  female here for a bipsy follow up    States she currently is doing well but noticed some days back increased discharge and bumps in vulvar area      Patient Active Problem List   Diagnosis   • Allergic rhinitis   • Attention deficit hyperactivity disorder   • Headache, migraine   • Vitamin D deficiency   • Thyromegaly   • Right hand pain   • Rhinosinusitis   • Functional diarrhea   • Left foot pain   • Costochondritis   • Precordial pain   • Tibial pain   • Leg swelling   • Current episode of major depressive disorder without prior episode   • Pain in both hands   • Obesity (BMI 30-39  9)   • Acute pain of left knee   • Left shoulder pain   • Lumbar radiculitis       Gynecologic History  Patient's last menstrual period was 06/11/2023 (exact date)  Past Medical History:   Diagnosis Date   • ADHD    • Allergic    • Anxiety    • Depression    • Migraine      Past Surgical History:   Procedure Laterality Date   • WISDOM TOOTH EXTRACTION       No family history on file  Social History     Socioeconomic History   • Marital status: Single     Spouse name: Not on file   • Number of children: Not on file   • Years of education: Not on file   • Highest education level: Not on file   Occupational History   • Occupation:    Tobacco Use   • Smoking status: Never   • Smokeless tobacco: Never   Vaping Use   • Vaping Use: Every day   • Substances: Nicotine, THC, Flavoring   Substance and Sexual Activity   • Alcohol use: Yes   • Drug use: Yes     Types: Marijuana   • Sexual activity: Yes     Partners: Male     Birth control/protection: None   Other Topics Concern   • Not on file   Social History Narrative   • Not on file     Social Determinants of Health     Financial Resource Strain: Not on file   Food Insecurity: Not on file   Transportation Needs: Not on file   Physical Activity: Not on file   Stress: Not on file   Social Connections: Not on file   Intimate Partner Violence: Not on file   Housing Stability: Not on file     Allergies   Allergen Reactions   • Kiwi Extract - Food Allergy    • Other Other (See Comments)     Cherries, sesame seed, clams, bananas  Patient has itching in throat     • Peanut Oil - Food Allergy Elevated titer with labs       Current Outpatient Medications:   •  clobetasol (TEMOVATE) 0 05 % cream, Apply topically 2 (two) times a day Apply to affected area if lesion present, Disp: 15 g, Rfl: 0  •  ibuprofen (MOTRIN) 600 mg tablet, Take 1 tablet (600 mg total) by mouth every 6 (six) hours as needed for mild pain, Disp: 60 tablet, Rfl: 0  •  lisdexamfetamine (Vyvanse) 30 MG capsule, Take 1 capsule (30 mg total) by mouth every morning Max Daily Amount: 30 mg, Disp: 30 capsule, Rfl: 0  •  montelukast (SINGULAIR) 10 mg tablet, TAKE 1 TABLET BY MOUTH EVERY DAY, Disp: 90 tablet, Rfl: 1  •  omeprazole (PriLOSEC) 40 MG capsule, Take 1 capsule (40 mg total) by mouth daily before breakfast, Disp: 90 capsule, Rfl: 2  •  EPINEPHrine (EpiPen 2-Jacinto) 0 3 mg/0 3 mL SOAJ, Inject 0 3 mL (0 3 mg total) into a muscle as needed for anaphylaxis (Patient not taking: Reported on 6/27/2023), Disp: 4 each, Rfl: 1  •  valACYclovir (VALTREX) 1,000 mg tablet, Take 1 tablet (1,000 mg total) by mouth 2 (two) times a day for 10 days (Patient not taking: Reported on 6/14/2023), Disp: 20 tablet, Rfl: 0    Review of Systems  Constitutional :no fever, feels well, no tiredness, no recent weight gain or loss  ENT: no ear ache, no loss of hearing, no nosebleeds or nasal discharge, no sore throat or hoarseness  Cardiovascular: no complaints of slow or fast heart beat, no chest pain, no palpitations, no leg claudication or lower extremity edema  Respiratory: no complaints of shortness of shortness of breath, no MEYERS  Breasts:no complaints of breast pain, breast lump, or nipple discharge  Gastrointestinal: no complaints of abdominal pain, constipation, nausea, vomiting, or diarrhea or bloody stools  Genitourinary : no complaints of dysuria, incontinence, pelvic pain, no dysmenorrhea, vaginal discharge or abnormal vaginal bleeding and as noted in HPI    Musculoskeletal: no complaints of arthralgia, no myalgia, no joint swelling or stiffness, no limb "pain or swelling  Integumentary: no complaints of skin rash or lesion, itching or dry skin  Neurological: no complaints of headache, no confusion, no numbness or tingling, no dizziness or fainting     Objective     /70   Ht 5' 4\" (1 626 m)   Wt 97 9 kg (215 lb 12 8 oz)   LMP 06/11/2023 (Exact Date)   BMI 37 04 kg/m²     General:   appears stated age, cooperative, alert normal mood and affect   Lungs: Unlabored breathing     Abdomen: soft, non-tender, without masses or organomegaly   Vulva: Normal- area of biopsy completely healed     Vagina: normal vagina, no discharge, exudate, lesion, or erythema   Urethra: normal   Skin normal skin turgor and no rashes     Psychiatric orientation to person, place, and time: normal  mood and affect: normal     "

## 2023-06-28 NOTE — TELEPHONE ENCOUNTER
----- Message from Codi Douglass sent at 6/28/2023  6:53 AM EDT -----  Regarding: FW: Doctors note  Contact: [phone]    ----- Message -----  From: Arpit Coleman  Sent: 6/28/2023   5:06 AM EDT  To: [facility]  Subject: Doctors note                                     Good morning, recently since Alex Stefanie started taking the vyvanse , Alex Stefanie been having trouble falling asleep & staying asleep through the night  I have work at 6 in the morning & Alex Stefanie gotten no sleep all night, I have to call off because I won’t be at my best & it’s giving me anxiety  Is there anyway you can provide me with a doctors note for me please

## 2023-07-06 DIAGNOSIS — G43.009 MIGRAINE WITHOUT AURA AND WITHOUT STATUS MIGRAINOSUS, NOT INTRACTABLE: ICD-10-CM

## 2023-07-06 RX ORDER — IBUPROFEN 600 MG/1
600 TABLET ORAL EVERY 6 HOURS PRN
Qty: 60 TABLET | Refills: 0 | Status: SHIPPED | OUTPATIENT
Start: 2023-07-06

## 2023-07-11 ENCOUNTER — TELEPHONE (OUTPATIENT)
Dept: FAMILY MEDICINE CLINIC | Facility: CLINIC | Age: 23
End: 2023-07-11

## 2023-07-11 ENCOUNTER — HOSPITAL ENCOUNTER (EMERGENCY)
Facility: HOSPITAL | Age: 23
Discharge: HOME | End: 2023-07-11
Attending: EMERGENCY MEDICINE
Payer: COMMERCIAL

## 2023-07-11 VITALS
HEART RATE: 69 BPM | SYSTOLIC BLOOD PRESSURE: 143 MMHG | OXYGEN SATURATION: 95 % | TEMPERATURE: 98.2 F | DIASTOLIC BLOOD PRESSURE: 103 MMHG | RESPIRATION RATE: 18 BRPM

## 2023-07-11 DIAGNOSIS — M79.605 LEG PAIN, ANTERIOR, LEFT: ICD-10-CM

## 2023-07-11 DIAGNOSIS — R07.89 NON-CARDIAC CHEST PAIN: Primary | ICD-10-CM

## 2023-07-11 LAB
ANION GAP SERPL CALCULATED.3IONS-SCNC: 6 MMOL/L
BASOPHILS # BLD AUTO: 0.01 THOUSANDS/ÂΜL (ref 0–0.1)
BASOPHILS NFR BLD AUTO: 0 % (ref 0–1)
BUN SERPL-MCNC: 7 MG/DL (ref 5–25)
CALCIUM SERPL-MCNC: 9.1 MG/DL (ref 8.4–10.2)
CARDIAC TROPONIN I PNL SERPL HS: <2 NG/L
CHLORIDE SERPL-SCNC: 107 MMOL/L (ref 96–108)
CO2 SERPL-SCNC: 22 MMOL/L (ref 21–32)
CREAT SERPL-MCNC: 0.73 MG/DL (ref 0.6–1.3)
D DIMER PPP FEU-MCNC: <0.27 UG/ML FEU
EOSINOPHIL # BLD AUTO: 0.17 THOUSAND/ÂΜL (ref 0–0.61)
EOSINOPHIL NFR BLD AUTO: 2 % (ref 0–6)
ERYTHROCYTE [DISTWIDTH] IN BLOOD BY AUTOMATED COUNT: 13.5 % (ref 11.6–15.1)
GFR SERPL CREATININE-BSD FRML MDRD: 117 ML/MIN/1.73SQ M
GLUCOSE SERPL-MCNC: 99 MG/DL (ref 65–140)
HCT VFR BLD AUTO: 34.2 % (ref 34.8–46.1)
HGB BLD-MCNC: 11.1 G/DL (ref 11.5–15.4)
IMM GRANULOCYTES # BLD AUTO: 0.04 THOUSAND/UL (ref 0–0.2)
IMM GRANULOCYTES NFR BLD AUTO: 0 % (ref 0–2)
LYMPHOCYTES # BLD AUTO: 1.61 THOUSANDS/ÂΜL (ref 0.6–4.47)
LYMPHOCYTES NFR BLD AUTO: 15 % (ref 14–44)
MCH RBC QN AUTO: 26.6 PG (ref 26.8–34.3)
MCHC RBC AUTO-ENTMCNC: 32.5 G/DL (ref 31.4–37.4)
MCV RBC AUTO: 82 FL (ref 82–98)
MONOCYTES # BLD AUTO: 0.93 THOUSAND/ÂΜL (ref 0.17–1.22)
MONOCYTES NFR BLD AUTO: 9 % (ref 4–12)
NEUTROPHILS # BLD AUTO: 7.85 THOUSANDS/ÂΜL (ref 1.85–7.62)
NEUTS SEG NFR BLD AUTO: 74 % (ref 43–75)
NRBC BLD AUTO-RTO: 0 /100 WBCS
PLATELET # BLD AUTO: 314 THOUSANDS/UL (ref 149–390)
PMV BLD AUTO: 8.5 FL (ref 8.9–12.7)
POTASSIUM SERPL-SCNC: 3.4 MMOL/L (ref 3.5–5.3)
RBC # BLD AUTO: 4.17 MILLION/UL (ref 3.81–5.12)
SODIUM SERPL-SCNC: 135 MMOL/L (ref 135–147)
WBC # BLD AUTO: 10.61 THOUSAND/UL (ref 4.31–10.16)

## 2023-07-11 PROCEDURE — 85025 COMPLETE CBC W/AUTO DIFF WBC: CPT | Performed by: EMERGENCY MEDICINE

## 2023-07-11 PROCEDURE — 85379 FIBRIN DEGRADATION QUANT: CPT | Performed by: EMERGENCY MEDICINE

## 2023-07-11 PROCEDURE — 99285 EMERGENCY DEPT VISIT HI MDM: CPT

## 2023-07-11 PROCEDURE — 80048 BASIC METABOLIC PNL TOTAL CA: CPT | Performed by: EMERGENCY MEDICINE

## 2023-07-11 PROCEDURE — 96374 THER/PROPH/DIAG INJ IV PUSH: CPT

## 2023-07-11 PROCEDURE — 36415 COLL VENOUS BLD VENIPUNCTURE: CPT | Performed by: EMERGENCY MEDICINE

## 2023-07-11 PROCEDURE — 84484 ASSAY OF TROPONIN QUANT: CPT | Performed by: EMERGENCY MEDICINE

## 2023-07-11 RX ORDER — KETOROLAC TROMETHAMINE 30 MG/ML
15 INJECTION, SOLUTION INTRAMUSCULAR; INTRAVENOUS ONCE
Status: COMPLETED | OUTPATIENT
Start: 2023-07-11 | End: 2023-07-11

## 2023-07-11 RX ADMIN — KETOROLAC TROMETHAMINE 15 MG: 30 INJECTION, SOLUTION INTRAMUSCULAR; INTRAVENOUS at 12:09

## 2023-07-11 NOTE — TELEPHONE ENCOUNTER
Got a call from pt, who passed the phone to the health center at her place of employment, but pt was still there for the call. Pt is c/o left arm pain, radiating to chest and upper back. She also stated her vision seemed blurry as well. I advised her to go to the ED. They are calling EMS to transport her.

## 2023-07-11 NOTE — DISCHARGE INSTRUCTIONS
Superficial Thrombophlebitis   WHAT YOU NEED TO KNOW:   Superficial thrombophlebitis (STP) is inflammation of a vein just under your skin (superficial vein). The inflammation causes a blood clot to form in your vein. STP most often happens in your leg but may also happen in your arm. You feel lightheaded, short of breath, and have chest pain. You cough up blood. Return to the emergency department if:   Your leg or arm turns pale or blue. Your leg or arm feels hot or cold. Your arm or leg feels more warm, more tender, and painful. It may look swollen and red. Manage your symptoms and prevent STP:  STP can increase your risk for a blood clot in deeper veins in your arms or legs. It can also increase your risk for a blood clot in your lungs. Do the following to decrease your risk for more blood clots and manage your symptoms:.           Elevate your leg or arm above the level of your heart as often as you can. This will help decrease swelling and pain. Prop your leg or arm on pillows or blankets to keep it elevated comfortably. Apply a warm compress to your arm or leg. This will help decrease swelling and pain. Wet a washcloth in warm water. Do not  use hot water. Apply the warm compress for 10 minutes. Repeat this 4 times each day. Do not smoke. Stay active. Activity helps prevent blood clots. Do not sit for more than an hour. If you travel by car or work at a desk, move and stretch in your seat several times each hour. In an airplane, get up and walk every hour. Exercise your legs while you are sitting by raising and lowering your heels. Keep your toes on the floor while you do this. You can also raise and lower your toes while keeping your heels on the floor. Also tighten and release your leg muscles while you are sitting.

## 2023-07-11 NOTE — ED PROVIDER NOTES
History  Chief Complaint   Patient presents with   • Chest Pain     Pt came in via EMS form work, pt reports chest pain earlier, reports L-arm heaviness, pain that comes and goes. Pt also reports some leg swollen for the past 2-3 days. 27 yo F brought to ED from her job at [email or web address], c/o pain she reports in her left arm, and chest, with sensation of swelling and heaviness . She has been experiencing an intermittent localized localized swelling on her left and right anterior lower legs, typically while sitting at work, resolves by applying a compression stocking and when she ambulates. She noticed the area on her left leg, which she managed as she usually does, then experienced the chest symptoms, so with the combincation of symptoms, she came to the ED for evaluation. History provided by:  Patient      Prior to Admission Medications   Prescriptions Last Dose Informant Patient Reported? Taking?    EPINEPHrine (EpiPen 2-Jacinto) 0.3 mg/0.3 mL SOAJ   No Yes   Sig: Inject 0.3 mL (0.3 mg total) into a muscle as needed for anaphylaxis   clobetasol (TEMOVATE) 0.05 % cream   No Yes   Sig: Apply topically 2 (two) times a day Apply to affected area if lesion present   ibuprofen (MOTRIN) 600 mg tablet   No Yes   Sig: TAKE 1 TABLET (600 MG TOTAL) BY MOUTH EVERY 6 (SIX) HOURS AS NEEDED FOR MILD PAIN   lisdexamfetamine (Vyvanse) 30 MG capsule   No Yes   Sig: Take 1 capsule (30 mg total) by mouth every morning Max Daily Amount: 30 mg   montelukast (SINGULAIR) 10 mg tablet   No Yes   Sig: TAKE 1 TABLET BY MOUTH EVERY DAY   omeprazole (PriLOSEC) 40 MG capsule   No Yes   Sig: Take 1 capsule (40 mg total) by mouth daily before breakfast   valACYclovir (VALTREX) 1,000 mg tablet   No No   Sig: Take 1 tablet (1,000 mg total) by mouth 2 (two) times a day for 10 days   Patient not taking: Reported on 6/14/2023      Facility-Administered Medications: None       Past Medical History:   Diagnosis Date   • ADHD    • Allergic    • Anxiety • Depression    • Migraine        Past Surgical History:   Procedure Laterality Date   • WISDOM TOOTH EXTRACTION         History reviewed. No pertinent family history. I have reviewed and agree with the history as documented. E-Cigarette/Vaping   • E-Cigarette Use Current Every Day User      E-Cigarette/Vaping Substances   • Nicotine Yes    • THC Yes    • CBD No    • Flavoring Yes    • Other No    • Unknown No      Social History     Tobacco Use   • Smoking status: Never   • Smokeless tobacco: Never   Vaping Use   • Vaping Use: Every day   • Substances: Nicotine, THC, Flavoring   Substance Use Topics   • Alcohol use: Yes   • Drug use: Yes     Types: Marijuana       Review of Systems    Physical Exam  Physical Exam  Vitals and nursing note reviewed. Constitutional:       General: She is not in acute distress. Appearance: She is well-developed. She is not diaphoretic. HENT:      Head: Normocephalic and atraumatic. Right Ear: External ear normal.      Left Ear: External ear normal.      Nose: Nose normal.   Eyes:      Conjunctiva/sclera: Conjunctivae normal.      Pupils: Pupils are equal, round, and reactive to light. Cardiovascular:      Rate and Rhythm: Normal rate and regular rhythm. Pulmonary:      Effort: Pulmonary effort is normal.   Abdominal:      Palpations: Abdomen is soft. Musculoskeletal:         General: Normal range of motion. Cervical back: Normal range of motion and neck supple. Skin:     General: Skin is warm and dry. Capillary Refill: Capillary refill takes less than 2 seconds. Findings: No rash. Neurological:      Mental Status: She is alert and oriented to person, place, and time. Gait: Gait normal.   Psychiatric:         Behavior: Behavior normal.         Thought Content:  Thought content normal.         Judgment: Judgment normal.         Vital Signs  ED Triage Vitals [07/11/23 1147]   Temperature Pulse Respirations Blood Pressure SpO2   98.2 °F (36.8 °C) 84 18 140/75 99 %      Temp Source Heart Rate Source Patient Position - Orthostatic VS BP Location FiO2 (%)   Oral Monitor Lying Right arm --      Pain Score       --           Vitals:    07/11/23 1147   BP: 140/75   Pulse: 84   Patient Position - Orthostatic VS: Lying         Visual Acuity      ED Medications  Medications   ketorolac (TORADOL) injection 15 mg (has no administration in time range)       Diagnostic Studies  Results Reviewed     Procedure Component Value Units Date/Time    CBC and differential [[id number]]     Lab Status: No result Specimen: Blood     HS Troponin 0hr (reflex protocol) [[id number]]     Lab Status: No result Specimen: Blood     Basic metabolic panel [[id number]]     Lab Status: No result Specimen: Blood     D-Dimer [[id number]]     Lab Status: No result Specimen: Blood                  No orders to display              Procedures  ECG 12 Lead Documentation Only    Date/Time: 7/11/2023 11:57 AM    Performed by: George Hu MD  Authorized by: George Hu MD    Rate:     ECG rate:  70  Rhythm:     Rhythm: sinus rhythm    Ectopy:     Ectopy: none    QRS:     QRS axis:  Normal    QRS intervals:  Normal  Conduction:     Conduction: normal    ST segments:     ST segments:  Normal  T waves:     T waves: normal               ED Course  ED Course as of 07/11/23 1542   Tue Jul 11, 2023   1244 D-Dimer, Quant: <0.27  Negative, no further concern for DVT. 1244 hs TnI 0hr: <2  Negative, no additional concern for cardiac ischemia   1254 Reviewed results with patient at bedside and updated on the plan                               SBIRT 20yo+    Flowsheet Row Most Recent Value   Initial Alcohol Screen: US AUDIT-C     1. How often do you have a drink containing alcohol? 0 Filed at: 07/11/2023 1151   2. How many drinks containing alcohol do you have on a typical day you are drinking? 0 Filed at: 07/11/2023 1151   3a. Male UNDER 65:  How often do you have five or more drinks on one occasion? 0 Filed at: 07/11/2023 1151   3b. FEMALE Any Age, or MALE 65+: How often do you have 4 or more drinks on one occassion? 0 Filed at: 07/11/2023 1151   Audit-C Score 0 Filed at: 07/11/2023 1151   KATHY: How many times in the past year have you. .. Used an illegal drug or used a prescription medication for non-medical reasons? Never Filed at: 07/11/2023 1151                    MDM    Disposition  Final diagnoses:   None     ED Disposition     None      Follow-up Information    None         Patient's Medications   Discharge Prescriptions    No medications on file       No discharge procedures on file.     PDMP Review       Value Time User    PDMP Reviewed  Yes 12/6/2022 11:58 AM Larissa Cooks, DO          ED Provider  Electronically Signed by           Stas Saunders MD  07/11/23 6770

## 2023-07-20 ENCOUNTER — OFFICE VISIT (OUTPATIENT)
Dept: PHYSICAL THERAPY | Facility: MEDICAL CENTER | Age: 23
End: 2023-07-20
Payer: COMMERCIAL

## 2023-07-20 DIAGNOSIS — M79.89 LEG SWELLING: Primary | ICD-10-CM

## 2023-07-20 PROCEDURE — 97112 NEUROMUSCULAR REEDUCATION: CPT | Performed by: PHYSICAL MEDICINE & REHABILITATION

## 2023-07-20 PROCEDURE — 97161 PT EVAL LOW COMPLEX 20 MIN: CPT | Performed by: PHYSICAL MEDICINE & REHABILITATION

## 2023-07-20 NOTE — PROGRESS NOTES
PT Evaluation     Today's date: 2023  Patient name: Gilma Jama  : 2000  MRN: [id number]  Referring provider: Cathy Sagastume DO  Dx:   Encounter Diagnosis     ICD-10-CM    1. Leg swelling  M79.89 Ambulatory Referral to Physical Therapy                     Assessment  Assessment details: Patient is a 26 y.o. female who reports to outpatient physical therapy with chronic bilateral leg pain. No further referral appears necessary at this time based upon examination results. Probable movement impairment diagnosis of neural hypomobility resulting in pathoanatomical symptoms of pain and decreased ROM and limiting ability to sit, ambulate and work. Skilled physical therapy is warranted for the application of the interventions found below in the planned intervention section. Prognosis is good given HEP compliance and attendance to physical therapy 2x for 8 weeks. Please contact me if you have any questions or recommendations. Thank you for the referral and the opportunity to share in [facility] care. Patient verbalized understanding of POC, HEP, and return demonstrated HEP. Impairments: abnormal coordination, abnormal gait, abnormal muscle firing, abnormal or restricted ROM, abnormal movement, activity intolerance, impaired balance, impaired physical strength, lacks appropriate home exercise program, pain with function and weight-bearing intolerance  Understanding of Dx/Px/POC: good   Prognosis: good    Goals  Impairment Goals  - Decrease pain by 50% in 4 weeks. - Increase left flexibility by 50% in 4 weeks. - Increase left lower extremity strength golbally to 4/5 in 6 weeks. - Increase left hip abductor and external rotator strength strength to 3/5  6 weeks.     Functional Goals  - Return to Prior Level of Function in 12 weeks  - Patient will be independent with HEP in 12 weeks    Plan  Patient would benefit from: skilled PT  Planned modality interventions: cryotherapy  Planned therapy interventions: joint mobilization, manual therapy, neuromuscular re-education, patient education, strengthening, stretching, therapeutic activities, therapeutic exercise, home exercise program, functional ROM exercises, Granados taping and postural training  Frequency: 2-3x week. Treatment plan discussed with: patient        Subjective Evaluation    History of Present Illness  Mechanism of injury: Work/School: [employer], at [employer], sitting most of the time,   Home Life: sitting at home a lot, laying down a lot,   Hobbies/exercise: not impacting her on hobbies,   Pain location: posterior left leg,   HPI: Patient reports she started to have posterior leg pain, left more than right. Started a couple of months.    Aggravating factors: Sitting for long periods of time,   Relieving factors: walking, pain wouldn't feel as deep, leg would feel tight  Patient Goals  Patient goal: have less pain at work, sleep through the night  Pain  Current pain ratin  At best pain ratin  At worst pain ratin          Objective     Static Posture     Comments  Lumbar ROM:  WNL    Joint Assessment: WNL, no pain at any location - patient did report she was not having pain this day    Hypomobility noted: L1, L2, L3, L4, L5,    Slight decrease in pain following repeated motions                Precautions: none      Manuals 2023                                                                Neuro Re-Ed                                                                                           HEP and Return Demo 10'            Ther Ex                                                                                                                     Ther Activity                                       Gait Training                                       Modalities

## 2023-07-26 DIAGNOSIS — K21.00 GASTROESOPHAGEAL REFLUX DISEASE WITH ESOPHAGITIS WITHOUT HEMORRHAGE: ICD-10-CM

## 2023-07-26 RX ORDER — OMEPRAZOLE 40 MG/1
40 CAPSULE, DELAYED RELEASE ORAL
Qty: 90 CAPSULE | Refills: 0 | Status: SHIPPED | OUTPATIENT
Start: 2023-07-26

## 2023-08-01 ENCOUNTER — APPOINTMENT (OUTPATIENT)
Dept: PHYSICAL THERAPY | Facility: MEDICAL CENTER | Age: 23
End: 2023-08-01
Payer: COMMERCIAL

## 2023-08-03 ENCOUNTER — OFFICE VISIT (OUTPATIENT)
Dept: PHYSICAL THERAPY | Facility: MEDICAL CENTER | Age: 23
End: 2023-08-03
Payer: COMMERCIAL

## 2023-08-03 DIAGNOSIS — M79.89 LEG SWELLING: Primary | ICD-10-CM

## 2023-08-03 PROCEDURE — 97110 THERAPEUTIC EXERCISES: CPT | Performed by: PHYSICAL MEDICINE & REHABILITATION

## 2023-08-03 PROCEDURE — 97112 NEUROMUSCULAR REEDUCATION: CPT | Performed by: PHYSICAL MEDICINE & REHABILITATION

## 2023-08-03 NOTE — PROGRESS NOTES
Daily Note     Today's date: 8/3/2023  Patient name: Leah Oconnell  : 2000  MRN: [id number]  Referring provider: Jb Espinoza DO  Dx:   Encounter Diagnosis     ICD-10-CM    1. Leg swelling  M79.89                      Subjective: Tere Goss reported "I am feeling better, but still having some pain."      Objective: See treatment diary below      Assessment: Tolerated treatment well. Patient would benefit from continued PT. Due to her report of decrease in pain she is being D/Helio to her HEP at this time. Plan: Continue per plan of care.       Precautions: none      Manuals 2023 8/3/2023                                                               Neuro Re-Ed                                                                                           HEP and Return Demo 10'            Ther Ex             Nerve Glides  3" 3x10           Ankle stretch 4x way  3x10           Lumbar Extension  3" 3x10                                                                            Ther Activity                                       Gait Training                                       Modalities

## 2023-08-14 ENCOUNTER — OFFICE VISIT (OUTPATIENT)
Dept: FAMILY MEDICINE CLINIC | Facility: CLINIC | Age: 23
End: 2023-08-14
Payer: COMMERCIAL

## 2023-08-14 VITALS
HEART RATE: 113 BPM | WEIGHT: 216.2 LBS | DIASTOLIC BLOOD PRESSURE: 82 MMHG | OXYGEN SATURATION: 98 % | TEMPERATURE: 97.5 F | HEIGHT: 64 IN | BODY MASS INDEX: 36.91 KG/M2 | SYSTOLIC BLOOD PRESSURE: 128 MMHG

## 2023-08-14 DIAGNOSIS — F90.0 ATTENTION DEFICIT HYPERACTIVITY DISORDER (ADHD), PREDOMINANTLY INATTENTIVE TYPE: ICD-10-CM

## 2023-08-14 DIAGNOSIS — K29.00 ACUTE SUPERFICIAL GASTRITIS WITHOUT HEMORRHAGE: ICD-10-CM

## 2023-08-14 DIAGNOSIS — M54.16 LUMBAR RADICULITIS: Primary | ICD-10-CM

## 2023-08-14 PROCEDURE — 99214 OFFICE O/P EST MOD 30 MIN: CPT | Performed by: FAMILY MEDICINE

## 2023-08-14 RX ORDER — DEXTROAMPHETAMINE SACCHARATE, AMPHETAMINE ASPARTATE MONOHYDRATE, DEXTROAMPHETAMINE SULFATE AND AMPHETAMINE SULFATE 2.5; 2.5; 2.5; 2.5 MG/1; MG/1; MG/1; MG/1
10 CAPSULE, EXTENDED RELEASE ORAL EVERY MORNING
Qty: 30 CAPSULE | Refills: 0 | Status: SHIPPED | OUTPATIENT
Start: 2023-08-14

## 2023-08-14 NOTE — PROGRESS NOTES
Assessment/Plan: Patient stopped vaping. Patient will remain off Vyvanse. Leg pain has improved. GERD stable. Patient will restart Adderall for ADHD. Follow-up in 2 months       Diagnoses and all orders for this visit:    Lumbar radiculitis    Acute superficial gastritis without hemorrhage    Attention deficit hyperactivity disorder (ADHD), predominantly inattentive type  -     amphetamine-dextroamphetamine (ADDERALL XR, 10MG,) 10 MG 24 hr capsule; Take 1 capsule (10 mg total) by mouth every morning Max Daily Amount: 10 mg            Subjective:        Patient ID: Tin Good is a 26 y.o. female. Patient is here to follow-up on multiple issues. Patient's radicular symptoms have resolved from back. Patient does get some back pain intermittently and does self corrections with some improvement. Patient's reflux symptoms occur intermittently also. Patient does use omeprazole daily. Patient did have reaction with side effects to Vyvanse and has stopped medication. Patient feeling better in this regard        The following portions of the patient's history were reviewed and updated as appropriate: allergies, current medications, past family history, past medical history, past social history, past surgical history and problem list.      Review of Systems   Constitutional: Negative. HENT: Negative. Eyes: Negative. Respiratory: Negative. Cardiovascular: Negative. Gastrointestinal: Negative. Endocrine: Negative. Genitourinary: Negative. Musculoskeletal: Positive for back pain. Skin: Negative. Allergic/Immunologic: Negative. Neurological: Negative. Hematological: Negative. Psychiatric/Behavioral: Negative.             Objective:               /82 (BP Location: Right arm, Patient Position: Sitting, Cuff Size: Standard)   Pulse (!) 113   Temp 97.5 °F (36.4 °C) (Temporal)   Ht 5' 4" (1.626 m)   Wt 98.1 kg (216 lb 3.2 oz)   SpO2 98%   BMI 37.11 kg/m² Physical Exam  Vitals and nursing note reviewed. Constitutional:       General: She is not in acute distress. Appearance: Normal appearance. She is not ill-appearing, toxic-appearing or diaphoretic. HENT:      Head: Normocephalic and atraumatic. Right Ear: Tympanic membrane, ear canal and external ear normal. There is no impacted cerumen. Left Ear: Tympanic membrane, ear canal and external ear normal. There is no impacted cerumen. Nose: Nose normal. No congestion or rhinorrhea. Mouth/Throat:      Mouth: Mucous membranes are moist.      Pharynx: No oropharyngeal exudate or posterior oropharyngeal erythema. Eyes:      General: No scleral icterus. Right eye: No discharge. Left eye: No discharge. Neck:      Vascular: No carotid bruit. Cardiovascular:      Rate and Rhythm: Normal rate and regular rhythm. Pulses: Normal pulses. Heart sounds: Normal heart sounds. No murmur heard. No friction rub. No gallop. Pulmonary:      Effort: Pulmonary effort is normal. No respiratory distress. Breath sounds: Normal breath sounds. No stridor. No wheezing, rhonchi or rales. Chest:      Chest wall: No tenderness. Musculoskeletal:         General: No swelling, tenderness, deformity or signs of injury. Normal range of motion. Cervical back: Normal range of motion and neck supple. No rigidity. No muscular tenderness. Right lower leg: No edema. Left lower leg: No edema. Lymphadenopathy:      Cervical: No cervical adenopathy. Skin:     General: Skin is warm and dry. Capillary Refill: Capillary refill takes less than 2 seconds. Coloration: Skin is not jaundiced. Findings: No bruising, erythema, lesion or rash. Neurological:      Mental Status: She is alert and oriented to person, place, and time. Mental status is at baseline. Cranial Nerves: No cranial nerve deficit. Sensory: No sensory deficit. Motor: No weakness. Coordination: Coordination normal.      Gait: Gait normal.   Psychiatric:         Mood and Affect: Mood normal.         Behavior: Behavior normal.         Thought Content:  Thought content normal.         Judgment: Judgment normal.

## 2023-08-17 ENCOUNTER — TELEPHONE (OUTPATIENT)
Dept: FAMILY MEDICINE CLINIC | Facility: CLINIC | Age: 23
End: 2023-08-17

## 2023-08-18 NOTE — TELEPHONE ENCOUNTER
Insurance approved the medication, valid for 30 per 30 days and for 12 months from 8/17/2023 - 8/17/2024. Approval letter faxed to [facility] pharmacy. [employer] message sent to patient.

## 2023-09-20 DIAGNOSIS — G43.009 MIGRAINE WITHOUT AURA AND WITHOUT STATUS MIGRAINOSUS, NOT INTRACTABLE: ICD-10-CM

## 2023-09-20 RX ORDER — IBUPROFEN 600 MG/1
600 TABLET ORAL EVERY 6 HOURS PRN
Qty: 60 TABLET | Refills: 0 | Status: SHIPPED | OUTPATIENT
Start: 2023-09-20

## 2023-10-03 ENCOUNTER — TELEPHONE (OUTPATIENT)
Dept: OBGYN CLINIC | Facility: MEDICAL CENTER | Age: 23
End: 2023-10-03

## 2023-10-03 NOTE — TELEPHONE ENCOUNTER
Returned patient's call. Patient c/o vaginal discharge and odor. Patient advised we recommend she comes in to the office to be checked for any infections. At that time, we can order STD labs if she would like to have them completed. Appointment scheduled for Thursday.

## 2023-10-03 NOTE — TELEPHONE ENCOUNTER
Patient called. Wanting to know if she can get labs placed to get tested for STD. Please review when you have a chance, thank you!

## 2023-10-05 ENCOUNTER — LAB (OUTPATIENT)
Dept: LAB | Facility: MEDICAL CENTER | Age: 23
End: 2023-10-05
Payer: COMMERCIAL

## 2023-10-05 ENCOUNTER — OFFICE VISIT (OUTPATIENT)
Dept: OBGYN CLINIC | Facility: MEDICAL CENTER | Age: 23
End: 2023-10-05
Payer: COMMERCIAL

## 2023-10-05 VITALS
HEIGHT: 64 IN | DIASTOLIC BLOOD PRESSURE: 75 MMHG | SYSTOLIC BLOOD PRESSURE: 118 MMHG | BODY MASS INDEX: 38.7 KG/M2 | WEIGHT: 226.7 LBS

## 2023-10-05 DIAGNOSIS — N89.8 VAGINAL ODOR: ICD-10-CM

## 2023-10-05 DIAGNOSIS — N76.0 BV (BACTERIAL VAGINOSIS): ICD-10-CM

## 2023-10-05 DIAGNOSIS — Z11.3 SCREEN FOR STD (SEXUALLY TRANSMITTED DISEASE): ICD-10-CM

## 2023-10-05 DIAGNOSIS — B96.89 BV (BACTERIAL VAGINOSIS): ICD-10-CM

## 2023-10-05 DIAGNOSIS — N89.8 VAGINAL DISCHARGE: Primary | ICD-10-CM

## 2023-10-05 LAB
BV WHIFF TEST VAG QL: ABNORMAL
CLUE CELLS SPEC QL WET PREP: ABNORMAL
HBV SURFACE AG SER QL: NORMAL
HIV 1+2 AB+HIV1 P24 AG SERPL QL IA: NORMAL
HIV 2 AB SERPL QL IA: NORMAL
HIV1 AB SERPL QL IA: NORMAL
HIV1 P24 AG SERPL QL IA: NORMAL
PH SMN: 5 [PH]
SL AMB POCT WET MOUNT: ABNORMAL
T VAGINALIS VAG QL WET PREP: ABNORMAL
TREPONEMA PALLIDUM IGG+IGM AB [PRESENCE] IN SERUM OR PLASMA BY IMMUNOASSAY: NORMAL
YEAST VAG QL WET PREP: ABNORMAL

## 2023-10-05 PROCEDURE — 87389 HIV-1 AG W/HIV-1&-2 AB AG IA: CPT

## 2023-10-05 PROCEDURE — 87591 N.GONORRHOEAE DNA AMP PROB: CPT | Performed by: OBSTETRICS & GYNECOLOGY

## 2023-10-05 PROCEDURE — 87491 CHLMYD TRACH DNA AMP PROBE: CPT | Performed by: OBSTETRICS & GYNECOLOGY

## 2023-10-05 PROCEDURE — 86780 TREPONEMA PALLIDUM: CPT

## 2023-10-05 PROCEDURE — 99214 OFFICE O/P EST MOD 30 MIN: CPT | Performed by: OBSTETRICS & GYNECOLOGY

## 2023-10-05 PROCEDURE — 36415 COLL VENOUS BLD VENIPUNCTURE: CPT

## 2023-10-05 PROCEDURE — 87340 HEPATITIS B SURFACE AG IA: CPT

## 2023-10-05 PROCEDURE — 87210 SMEAR WET MOUNT SALINE/INK: CPT | Performed by: OBSTETRICS & GYNECOLOGY

## 2023-10-05 RX ORDER — METRONIDAZOLE 7.5 MG/G
1 GEL VAGINAL
Qty: 70 G | Refills: 0 | Status: SHIPPED | OUTPATIENT
Start: 2023-10-05

## 2023-10-05 NOTE — PROGRESS NOTES
[facility] [address] [place] [address]    Assessment/Plan:  No problem-specific Assessment & Plan notes found for this encounter. Diagnoses and all orders for this visit:    Vaginal discharge  -     POCT wet mount  -     metroNIDAZOLE (METROGEL) 0.75 % vaginal gel; Insert 1 Application into the vagina daily at bedtime    Vaginal odor  -     POCT wet mount  -     metroNIDAZOLE (METROGEL) 0.75 % vaginal gel; Insert 1 Application into the vagina daily at bedtime    Screen for STD (sexually transmitted disease)  -     HIV 1/2 AG/AB w Reflex [facility] for 2 yr old and above; Future  -     RPR-Syphilis Screening (Total Syphilis IGG/IGM); Future  -     Hepatitis B surface antigen; Future  -     Chlamydia/GC amplified DNA by PCR    BV (bacterial vaginosis)  -     metroNIDAZOLE (METROGEL) 0.75 % vaginal gel; Insert 1 Application into the vagina daily at bedtime          Subjective: Juan Melo is a 26 y.o. [address] [place] female. CC: discharge, odor    HPI: HPI  Patient presents with complaints of vaginal discharge and odor. States it has been on and off since June. No abnormal bleeding. ROS: Review of Systems   Constitutional: Negative. Respiratory: Negative. Cardiovascular: Negative. Gastrointestinal: Negative. Genitourinary: Positive for vaginal discharge. Negative for dysuria. Musculoskeletal: Negative. All other systems reviewed and are negative. PFSH: The following portions of the patient's history were reviewed and updated as appropriate: allergies, current medications, past family history, past medical history, obstetric history, gynecologic history, past social history, past surgical history and problem list.       Objective:  /75   Ht 5' 4" (1.626 m)   Wt 103 kg (226 lb 11.2 oz)   BMI 38.91 kg/m²    Physical Exam  Vitals reviewed. Constitutional:       Appearance: Normal appearance. Cardiovascular:      Rate and Rhythm: Normal rate. Pulmonary:      Effort: Pulmonary effort is normal. No respiratory distress. Genitourinary:     General: Normal vulva. Exam position: Lithotomy position. Labia:         Right: No tenderness or lesion. Left: No tenderness or lesion. Vagina: Normal.      Cervix: Normal.   Neurological:      Mental Status: She is alert.    Psychiatric:         Mood and Affect: Mood normal.         Behavior: Behavior normal.

## 2023-10-06 LAB
C TRACH DNA SPEC QL NAA+PROBE: NEGATIVE
N GONORRHOEA DNA SPEC QL NAA+PROBE: NEGATIVE

## 2023-11-10 DIAGNOSIS — K21.00 GASTROESOPHAGEAL REFLUX DISEASE WITH ESOPHAGITIS WITHOUT HEMORRHAGE: ICD-10-CM

## 2023-11-10 RX ORDER — OMEPRAZOLE 40 MG/1
40 CAPSULE, DELAYED RELEASE ORAL
Qty: 90 CAPSULE | Refills: 0 | Status: SHIPPED | OUTPATIENT
Start: 2023-11-10

## 2023-12-05 DIAGNOSIS — J45.909 ASTHMA DUE TO ENVIRONMENTAL ALLERGIES: ICD-10-CM

## 2023-12-05 RX ORDER — MONTELUKAST SODIUM 10 MG/1
TABLET ORAL
Qty: 90 TABLET | Refills: 0 | Status: SHIPPED | OUTPATIENT
Start: 2023-12-05

## 2024-01-29 ENCOUNTER — OFFICE VISIT (OUTPATIENT)
Dept: FAMILY MEDICINE CLINIC | Facility: CLINIC | Age: 24
End: 2024-01-29
Payer: COMMERCIAL

## 2024-01-29 VITALS
HEIGHT: 64 IN | DIASTOLIC BLOOD PRESSURE: 78 MMHG | TEMPERATURE: 97.8 F | BODY MASS INDEX: 40.29 KG/M2 | SYSTOLIC BLOOD PRESSURE: 128 MMHG | WEIGHT: 236 LBS

## 2024-01-29 DIAGNOSIS — E66.9 OBESITY (BMI 30-39.9): ICD-10-CM

## 2024-01-29 DIAGNOSIS — G43.009 MIGRAINE WITHOUT AURA AND WITHOUT STATUS MIGRAINOSUS, NOT INTRACTABLE: Primary | ICD-10-CM

## 2024-01-29 DIAGNOSIS — F90.0 ATTENTION DEFICIT HYPERACTIVITY DISORDER (ADHD), PREDOMINANTLY INATTENTIVE TYPE: ICD-10-CM

## 2024-01-29 DIAGNOSIS — N94.6 DYSMENORRHEA: ICD-10-CM

## 2024-01-29 PROCEDURE — 99214 OFFICE O/P EST MOD 30 MIN: CPT | Performed by: FAMILY MEDICINE

## 2024-01-29 RX ORDER — TOPIRAMATE 25 MG/1
25 TABLET ORAL 2 TIMES DAILY
Qty: 60 TABLET | Refills: 2 | Status: SHIPPED | OUTPATIENT
Start: 2024-01-29

## 2024-01-29 NOTE — PROGRESS NOTES
Assessment/Plan: Labs records reviewed.  Patient on further laboratory studies for conditions listed below.  Patient will start Topamax as directed assuming patient is not pregnant..  Patient will use Ubrelvy 50 mg as needed for migraine.  Follow-up as needed       Diagnoses and all orders for this visit:    Migraine without aura and without status migrainosus, not intractable  -     Comprehensive metabolic panel; Future  -     CBC and differential; Future  -     Lipid panel; Future  -     TSH, 3rd generation with Free T4 reflex; Future  -     topiramate (Topamax) 25 mg tablet; Take 1 tablet (25 mg total) by mouth 2 (two) times a day  -     Ubrogepant (UBRELVY) 50 MG tablet; Take 1 tablet (50 mg) one time as needed for migraine. May repeat one additional tablet (50 mg) at least two hours after the first dose. Do not use more than two doses per day, or for more than eight days per month.    Attention deficit hyperactivity disorder (ADHD), predominantly inattentive type  -     Comprehensive metabolic panel; Future  -     CBC and differential; Future  -     Lipid panel; Future  -     TSH, 3rd generation with Free T4 reflex; Future    Obesity (BMI 30-39.9)  -     Comprehensive metabolic panel; Future  -     CBC and differential; Future  -     Lipid panel; Future  -     TSH, 3rd generation with Free T4 reflex; Future    Dysmenorrhea  -     hCG Ql w/reflex to hCG Qn; Future            Subjective:        Patient ID: Shital Jones is a 24 y.o. female.      Patient is here with worsening headaches.  Patient with history of migraines.  Patient can have a few times a week and then Periods without headaches.  Patient does notice some photophobia.  No fevers.  Patient also having some dysmenorrhea.  No significant change in urination or defecation.  Patient vision stable.  Patient has noticed significant weight gain over the past couple months.    Headache        The following portions of the patient's history were reviewed  "and updated as appropriate: allergies, current medications, past family history, past medical history, past social history, past surgical history and problem list.      Review of Systems   Constitutional: Negative.    HENT: Negative.     Eyes: Negative.    Respiratory: Negative.     Cardiovascular: Negative.    Gastrointestinal: Negative.    Endocrine: Negative.    Genitourinary:         Dysmenorrhea   Musculoskeletal: Negative.    Skin: Negative.    Allergic/Immunologic: Negative.    Neurological:  Positive for headaches.   Hematological: Negative.    Psychiatric/Behavioral: Negative.             Objective:        Depression Screening and Follow-up Plan: Patient was screened for depression during today's encounter. They screened negative with a PHQ-9 score of 0.            /78 (BP Location: Right arm, Patient Position: Sitting, Cuff Size: Standard)   Temp 97.8 °F (36.6 °C) (Temporal)   Ht 5' 4\" (1.626 m)   Wt 107 kg (236 lb)   LMP 01/27/2024   BMI 40.51 kg/m²          Physical Exam  Vitals and nursing note reviewed.   Constitutional:       General: She is not in acute distress.     Appearance: Normal appearance. She is not ill-appearing, toxic-appearing or diaphoretic.   HENT:      Head: Normocephalic and atraumatic.      Right Ear: Tympanic membrane, ear canal and external ear normal. There is no impacted cerumen.      Left Ear: Tympanic membrane, ear canal and external ear normal. There is no impacted cerumen.      Nose: Nose normal. No congestion or rhinorrhea.      Mouth/Throat:      Mouth: Mucous membranes are moist.      Pharynx: No oropharyngeal exudate or posterior oropharyngeal erythema.   Eyes:      General: No scleral icterus.        Right eye: No discharge.         Left eye: No discharge.   Neck:      Vascular: No carotid bruit.   Cardiovascular:      Rate and Rhythm: Normal rate and regular rhythm.      Pulses: Normal pulses.      Heart sounds: Normal heart sounds. No murmur heard.     No " friction rub. No gallop.   Pulmonary:      Effort: Pulmonary effort is normal. No respiratory distress.      Breath sounds: Normal breath sounds. No stridor. No wheezing, rhonchi or rales.   Chest:      Chest wall: No tenderness.   Musculoskeletal:         General: No swelling, tenderness, deformity or signs of injury. Normal range of motion.      Cervical back: Normal range of motion and neck supple. No rigidity. No muscular tenderness.      Right lower leg: No edema.      Left lower leg: No edema.   Lymphadenopathy:      Cervical: No cervical adenopathy.   Skin:     General: Skin is warm and dry.      Capillary Refill: Capillary refill takes less than 2 seconds.      Coloration: Skin is not jaundiced.      Findings: No bruising, erythema, lesion or rash.   Neurological:      Mental Status: She is alert and oriented to person, place, and time. Mental status is at baseline.      Cranial Nerves: No cranial nerve deficit.      Sensory: No sensory deficit.      Motor: No weakness.      Coordination: Coordination normal.      Gait: Gait normal.   Psychiatric:         Mood and Affect: Mood normal.         Behavior: Behavior normal.         Thought Content: Thought content normal.         Judgment: Judgment normal.

## 2024-02-07 ENCOUNTER — APPOINTMENT (OUTPATIENT)
Dept: LAB | Facility: MEDICAL CENTER | Age: 24
End: 2024-02-07
Payer: COMMERCIAL

## 2024-02-07 DIAGNOSIS — G43.009 MIGRAINE WITHOUT AURA AND WITHOUT STATUS MIGRAINOSUS, NOT INTRACTABLE: ICD-10-CM

## 2024-02-07 DIAGNOSIS — N94.6 DYSMENORRHEA: ICD-10-CM

## 2024-02-07 DIAGNOSIS — E66.9 OBESITY (BMI 30-39.9): ICD-10-CM

## 2024-02-07 DIAGNOSIS — F90.0 ATTENTION DEFICIT HYPERACTIVITY DISORDER (ADHD), PREDOMINANTLY INATTENTIVE TYPE: ICD-10-CM

## 2024-02-07 LAB
BASOPHILS # BLD AUTO: 0.02 THOUSANDS/ÂΜL (ref 0–0.1)
BASOPHILS NFR BLD AUTO: 0 % (ref 0–1)
EOSINOPHIL # BLD AUTO: 0.25 THOUSAND/ÂΜL (ref 0–0.61)
EOSINOPHIL NFR BLD AUTO: 3 % (ref 0–6)
ERYTHROCYTE [DISTWIDTH] IN BLOOD BY AUTOMATED COUNT: 13.8 % (ref 11.6–15.1)
HCT VFR BLD AUTO: 37.1 % (ref 34.8–46.1)
HGB BLD-MCNC: 11.8 G/DL (ref 11.5–15.4)
IMM GRANULOCYTES # BLD AUTO: 0.04 THOUSAND/UL (ref 0–0.2)
IMM GRANULOCYTES NFR BLD AUTO: 0 % (ref 0–2)
LYMPHOCYTES # BLD AUTO: 1.59 THOUSANDS/ÂΜL (ref 0.6–4.47)
LYMPHOCYTES NFR BLD AUTO: 18 % (ref 14–44)
MCH RBC QN AUTO: 25.6 PG (ref 26.8–34.3)
MCHC RBC AUTO-ENTMCNC: 31.8 G/DL (ref 31.4–37.4)
MCV RBC AUTO: 81 FL (ref 82–98)
MONOCYTES # BLD AUTO: 0.95 THOUSAND/ÂΜL (ref 0.17–1.22)
MONOCYTES NFR BLD AUTO: 11 % (ref 4–12)
NEUTROPHILS # BLD AUTO: 6.11 THOUSANDS/ÂΜL (ref 1.85–7.62)
NEUTS SEG NFR BLD AUTO: 68 % (ref 43–75)
NRBC BLD AUTO-RTO: 0 /100 WBCS
PLATELET # BLD AUTO: 403 THOUSANDS/UL (ref 149–390)
PMV BLD AUTO: 8.8 FL (ref 8.9–12.7)
RBC # BLD AUTO: 4.61 MILLION/UL (ref 3.81–5.12)
WBC # BLD AUTO: 8.96 THOUSAND/UL (ref 4.31–10.16)

## 2024-02-07 PROCEDURE — 36415 COLL VENOUS BLD VENIPUNCTURE: CPT

## 2024-02-07 PROCEDURE — 84703 CHORIONIC GONADOTROPIN ASSAY: CPT

## 2024-02-07 PROCEDURE — 84443 ASSAY THYROID STIM HORMONE: CPT

## 2024-02-07 PROCEDURE — 80053 COMPREHEN METABOLIC PANEL: CPT

## 2024-02-07 PROCEDURE — 84702 CHORIONIC GONADOTROPIN TEST: CPT

## 2024-02-07 PROCEDURE — 80061 LIPID PANEL: CPT

## 2024-02-07 PROCEDURE — 85025 COMPLETE CBC W/AUTO DIFF WBC: CPT

## 2024-02-08 LAB
ALBUMIN SERPL BCP-MCNC: 4.8 G/DL (ref 3.5–5)
ALP SERPL-CCNC: 86 U/L (ref 34–104)
ALT SERPL W P-5'-P-CCNC: 17 U/L (ref 7–52)
ANION GAP SERPL CALCULATED.3IONS-SCNC: 11 MMOL/L
AST SERPL W P-5'-P-CCNC: 16 U/L (ref 13–39)
B-HCG SERPL-ACNC: <1 MIU/ML (ref 0–5)
BILIRUB SERPL-MCNC: 0.61 MG/DL (ref 0.2–1)
BUN SERPL-MCNC: 10 MG/DL (ref 5–25)
CALCIUM SERPL-MCNC: 9.7 MG/DL (ref 8.4–10.2)
CHLORIDE SERPL-SCNC: 103 MMOL/L (ref 96–108)
CHOLEST SERPL-MCNC: 155 MG/DL
CO2 SERPL-SCNC: 24 MMOL/L (ref 21–32)
CREAT SERPL-MCNC: 0.83 MG/DL (ref 0.6–1.3)
GFR SERPL CREATININE-BSD FRML MDRD: 99 ML/MIN/1.73SQ M
GLUCOSE P FAST SERPL-MCNC: 90 MG/DL (ref 65–99)
HCG SERPL QL: NEGATIVE
HDLC SERPL-MCNC: 42 MG/DL
LDLC SERPL CALC-MCNC: 89 MG/DL (ref 0–100)
NONHDLC SERPL-MCNC: 113 MG/DL
POTASSIUM SERPL-SCNC: 3.8 MMOL/L (ref 3.5–5.3)
PROT SERPL-MCNC: 8 G/DL (ref 6.4–8.4)
SODIUM SERPL-SCNC: 138 MMOL/L (ref 135–147)
TRIGL SERPL-MCNC: 119 MG/DL
TSH SERPL DL<=0.05 MIU/L-ACNC: 0.63 UIU/ML (ref 0.45–4.5)

## 2024-02-20 DIAGNOSIS — G43.009 MIGRAINE WITHOUT AURA AND WITHOUT STATUS MIGRAINOSUS, NOT INTRACTABLE: ICD-10-CM

## 2024-02-20 RX ORDER — IBUPROFEN 600 MG/1
600 TABLET ORAL EVERY 6 HOURS PRN
Qty: 60 TABLET | Refills: 1 | Status: SHIPPED | OUTPATIENT
Start: 2024-02-20

## 2024-02-21 PROBLEM — J32.9 RHINOSINUSITIS: Status: RESOLVED | Noted: 2019-05-17 | Resolved: 2024-02-21

## 2024-03-04 DIAGNOSIS — G43.009 MIGRAINE WITHOUT AURA AND WITHOUT STATUS MIGRAINOSUS, NOT INTRACTABLE: ICD-10-CM

## 2024-03-05 RX ORDER — TOPIRAMATE 25 MG/1
25 TABLET ORAL 2 TIMES DAILY
Qty: 180 TABLET | Refills: 1 | Status: SHIPPED | OUTPATIENT
Start: 2024-03-05

## 2024-03-09 DIAGNOSIS — J45.909 ASTHMA DUE TO ENVIRONMENTAL ALLERGIES: ICD-10-CM

## 2024-03-09 RX ORDER — MONTELUKAST SODIUM 10 MG/1
TABLET ORAL
Qty: 90 TABLET | Refills: 0 | Status: SHIPPED | OUTPATIENT
Start: 2024-03-09

## 2024-03-29 ENCOUNTER — OFFICE VISIT (OUTPATIENT)
Dept: FAMILY MEDICINE CLINIC | Facility: CLINIC | Age: 24
End: 2024-03-29
Payer: COMMERCIAL

## 2024-03-29 VITALS
HEART RATE: 70 BPM | HEIGHT: 64 IN | TEMPERATURE: 98.4 F | BODY MASS INDEX: 39.88 KG/M2 | WEIGHT: 233.6 LBS | SYSTOLIC BLOOD PRESSURE: 136 MMHG | DIASTOLIC BLOOD PRESSURE: 74 MMHG | OXYGEN SATURATION: 98 %

## 2024-03-29 DIAGNOSIS — J06.9 ACUTE UPPER RESPIRATORY INFECTION: Primary | ICD-10-CM

## 2024-03-29 PROCEDURE — 99213 OFFICE O/P EST LOW 20 MIN: CPT | Performed by: FAMILY MEDICINE

## 2024-03-29 RX ORDER — AMOXICILLIN 500 MG/1
500 TABLET, FILM COATED ORAL 3 TIMES DAILY
Qty: 21 TABLET | Refills: 0 | Status: SHIPPED | OUTPATIENT
Start: 2024-03-29 | End: 2024-04-05

## 2024-04-02 NOTE — PROGRESS NOTES
"Assessment/Plan:    25 y/o woman with: acute URI. Discussed supportive care and return parameters. Will add amoxil.     No problem-specific Assessment & Plan notes found for this encounter.       Diagnoses and all orders for this visit:    Acute upper respiratory infection  -     amoxicillin (AMOXIL) 500 MG tablet; Take 1 tablet (500 mg total) by mouth 3 (three) times a day for 7 days          Subjective:     Chief Complaint   Patient presents with    Sore Throat     Patient is here today complaining of sore throat which she woke up with this morning stating her throat is very red. No further concerns, ng        Patient ID: Shital Jones is a 24 y.o. female.    Patient is a 25 y/o woman who presents complaining of cough congestion sinus pressure no fevers chills nausea or vomiting.    Sore Throat   Associated symptoms include congestion and coughing.       The following portions of the patient's history were reviewed and updated as appropriate: allergies, current medications, past family history, past medical history, past social history, past surgical history and problem list.    Review of Systems   Constitutional: Negative.    HENT:  Positive for congestion, sinus pressure and sore throat.    Eyes: Negative.    Respiratory:  Positive for cough.    Cardiovascular: Negative.    Gastrointestinal: Negative.    Endocrine: Negative.    Genitourinary: Negative.    Musculoskeletal: Negative.    Allergic/Immunologic: Negative.    Neurological: Negative.    Hematological: Negative.    Psychiatric/Behavioral: Negative.     All other systems reviewed and are negative.        Objective:      /74 (BP Location: Right arm, Patient Position: Sitting, Cuff Size: Large)   Pulse 70   Temp 98.4 °F (36.9 °C) (Temporal)   Ht 5' 4\" (1.626 m)   Wt 106 kg (233 lb 9.6 oz)   SpO2 98%   BMI 40.10 kg/m²          Physical Exam  Constitutional:       Appearance: She is well-developed.   HENT:      Head: Atraumatic.      Right Ear: " External ear normal.      Left Ear: External ear normal.   Eyes:      Conjunctiva/sclera: Conjunctivae normal.      Pupils: Pupils are equal, round, and reactive to light.   Cardiovascular:      Rate and Rhythm: Normal rate and regular rhythm.      Heart sounds: Normal heart sounds.   Pulmonary:      Effort: Pulmonary effort is normal. No respiratory distress.      Breath sounds: Normal breath sounds.   Abdominal:      General: Bowel sounds are normal. There is no distension.      Palpations: Abdomen is soft.      Tenderness: There is no abdominal tenderness. There is no guarding or rebound.   Musculoskeletal:         General: Normal range of motion.      Cervical back: Normal range of motion.   Skin:     General: Skin is warm and dry.   Neurological:      Mental Status: She is alert and oriented to person, place, and time.      Cranial Nerves: No cranial nerve deficit.   Psychiatric:         Behavior: Behavior normal.         Thought Content: Thought content normal.         Judgment: Judgment normal.

## 2024-05-01 PROBLEM — J06.9 ACUTE UPPER RESPIRATORY INFECTION: Status: RESOLVED | Noted: 2024-03-29 | Resolved: 2024-05-01

## 2024-05-20 ENCOUNTER — OFFICE VISIT (OUTPATIENT)
Dept: FAMILY MEDICINE CLINIC | Facility: CLINIC | Age: 24
End: 2024-05-20
Payer: COMMERCIAL

## 2024-05-20 VITALS
BODY MASS INDEX: 38.62 KG/M2 | SYSTOLIC BLOOD PRESSURE: 120 MMHG | HEART RATE: 75 BPM | WEIGHT: 225 LBS | OXYGEN SATURATION: 99 % | DIASTOLIC BLOOD PRESSURE: 80 MMHG | TEMPERATURE: 98.4 F

## 2024-05-20 DIAGNOSIS — S93.432A SPRAIN OF TIBIOFIBULAR LIGAMENT OF LEFT ANKLE, INITIAL ENCOUNTER: Primary | ICD-10-CM

## 2024-05-20 PROCEDURE — 99214 OFFICE O/P EST MOD 30 MIN: CPT | Performed by: FAMILY MEDICINE

## 2024-05-20 NOTE — PROGRESS NOTES
Assessment/Plan:      Diagnoses and all orders for this visit:    Sprain of tibiofibular ligament of left ankle, initial encounter     I recommend resting the left foot and ankle is much as possible.  Elevation.  Ice 20 minutes out of each hour.  Follow-up if not a lot better in 2 to 3 days.    Subjective:     Patient ID: Shital Jones is a 24 y.o. female.    Patient presents with:  sprain: L ankle and foot.  Fell down stairs 2 days ago            Review of Systems   Constitutional: Negative.    HENT: Negative.     Eyes: Negative.    Respiratory: Negative.     Cardiovascular: Negative.    Gastrointestinal: Negative.    Endocrine: Negative.    Genitourinary: Negative.    Musculoskeletal:  Positive for joint swelling.        As noted I HPI.   Skin: Negative.    Allergic/Immunologic: Negative.    Neurological: Negative.    Hematological: Negative.    Psychiatric/Behavioral: Negative.     All other systems reviewed and are negative.        Objective:     Physical Exam  Vitals and nursing note reviewed.   Constitutional:       Appearance: She is well-developed.   HENT:      Head: Normocephalic and atraumatic.   Eyes:      Pupils: Pupils are equal, round, and reactive to light.   Neck:      Vascular: No JVD.   Cardiovascular:      Rate and Rhythm: Normal rate.   Pulmonary:      Effort: Pulmonary effort is normal.   Abdominal:      Palpations: Abdomen is soft.   Musculoskeletal:      Cervical back: Normal range of motion.      Comments: Exam of the left foot and ankle shows full range of motion neurovascular is intact.  She is tender along the lateral aspect of her left foot there is swelling in the foot and ankle.   Lymphadenopathy:      Cervical: No cervical adenopathy.   Neurological:      General: No focal deficit present.      Mental Status: She is alert and oriented to person, place, and time.

## 2024-05-20 NOTE — LETTER
May 20, 2024     Patient: Shital Jones  YOB: 2000  Date of Visit: 5/20/2024      To Whom it May Concern:    Shital Jones is under my professional care. Shital was seen in my office on 5/20/2024. Shital may return to work on Tuesday, May 21, 2024. .    If you have any questions or concerns, please don't hesitate to call.         Sincerely,          Azar Palencia, DO        CC: No Recipients

## 2024-05-21 ENCOUNTER — TELEPHONE (OUTPATIENT)
Age: 24
End: 2024-05-21

## 2024-05-21 NOTE — LETTER
May 21, 2024     Patient: Shital Jones  YOB: 2000  Date of Visit: 5/21/2024      To Whom it May Concern:    Shital Jones is under my professional care. Shital was seen in my office on 5/21/2024. Shital may return to school on May 21, 2024.  Her injury occurred on May 19, 2024.  Please excuse her from work May 19, 2024 and May 20, 2024.  I am sorry I was not more explicit on my last note .    If you have any questions or concerns, please don't hesitate to call.         Sincerely,          Azar Palencia, DO        CC: No Recipients

## 2024-05-21 NOTE — TELEPHONE ENCOUNTER
Rcvd call from patient who said that she took the work excuse letter to her boss but she needs to have an excuse for the 19th and 20th. States the injury happened on 5/19 and she came into the office on 5/20.  Would like the new letter to be put in MyChart.

## 2024-06-04 ENCOUNTER — PATIENT MESSAGE (OUTPATIENT)
Dept: FAMILY MEDICINE CLINIC | Facility: CLINIC | Age: 24
End: 2024-06-04

## 2024-06-04 DIAGNOSIS — S93.432A SPRAIN OF TIBIOFIBULAR LIGAMENT OF LEFT ANKLE, INITIAL ENCOUNTER: Primary | ICD-10-CM

## 2024-06-07 ENCOUNTER — APPOINTMENT (OUTPATIENT)
Dept: RADIOLOGY | Facility: MEDICAL CENTER | Age: 24
End: 2024-06-07
Payer: COMMERCIAL

## 2024-06-07 DIAGNOSIS — S93.432A SPRAIN OF TIBIOFIBULAR LIGAMENT OF LEFT ANKLE, INITIAL ENCOUNTER: ICD-10-CM

## 2024-06-07 PROCEDURE — 73630 X-RAY EXAM OF FOOT: CPT

## 2024-06-08 DIAGNOSIS — G43.009 MIGRAINE WITHOUT AURA AND WITHOUT STATUS MIGRAINOSUS, NOT INTRACTABLE: ICD-10-CM

## 2024-06-08 RX ORDER — IBUPROFEN 600 MG/1
600 TABLET ORAL EVERY 6 HOURS PRN
Qty: 60 TABLET | Refills: 0 | Status: SHIPPED | OUTPATIENT
Start: 2024-06-08

## 2024-08-06 DIAGNOSIS — G43.009 MIGRAINE WITHOUT AURA AND WITHOUT STATUS MIGRAINOSUS, NOT INTRACTABLE: ICD-10-CM

## 2024-08-06 RX ORDER — IBUPROFEN 600 MG/1
600 TABLET ORAL EVERY 6 HOURS PRN
Qty: 60 TABLET | Refills: 0 | Status: SHIPPED | OUTPATIENT
Start: 2024-08-06

## 2024-09-25 ENCOUNTER — NURSE TRIAGE (OUTPATIENT)
Age: 24
End: 2024-09-25

## 2024-09-25 NOTE — TELEPHONE ENCOUNTER
"Pt called in reporting a foul vaginal odor that has been occurring after her periods for the last couple of cycles. States last cycle, she used metrogel which helped but symptoms returned with her period again. LMP was last week. She is currently experiencing foul odor and vaginal irritation/redness. Denies any abnormal discharge, abdominal pain, fever. Pt offered appointments this week, unable to make them. Pt scheduled for 9/30/24 and is aware to call back with any worsening symptoms in the meantime. Pt thankful.     Reason for Disposition   Patient wants to be seen    Answer Assessment - Initial Assessment Questions  1. SYMPTOM: \"What's the main symptom you're concerned about?\" (e.g., pain, itching, dryness)      Foul vaginal odor and vaginal redness/irritation  2. LOCATION: \"Where is the  symptom located?\" (e.g., inside/outside, left/right)      vagina  3. ONSET: \"When did the  symptoms  start?\"      Off and on for the last couple of months  4. PAIN: \"Is there any pain?\" If Yes, ask: \"How bad is it?\" (Scale: 1-10; mild, moderate, severe)      Vaginal irritation  5. ITCHING: \"Is there any itching?\" If Yes, ask: \"How bad is it?\" (Scale: 1-10; mild, moderate, severe)      denies  6. CAUSE: \"What do you think is causing the discharge?\" \"Have you had the same problem before? What happened then?\"      unsure  7. OTHER SYMPTOMS: \"Do you have any other symptoms?\" (e.g., fever, itching, vaginal bleeding, pain with urination, injury to genital area, vaginal foreign body)      denies  8. PREGNANCY: \"Is there any chance you are pregnant?\" \"When was your last menstrual period?\"      LMP last week    Protocols used: Vaginal Symptoms-ADULT-[place]    "

## 2024-09-30 ENCOUNTER — TELEPHONE (OUTPATIENT)
Age: 24
End: 2024-09-30

## 2024-09-30 ENCOUNTER — OFFICE VISIT (OUTPATIENT)
Dept: OBGYN CLINIC | Facility: CLINIC | Age: 24
End: 2024-09-30
Payer: COMMERCIAL

## 2024-09-30 ENCOUNTER — APPOINTMENT (OUTPATIENT)
Dept: LAB | Facility: CLINIC | Age: 24
End: 2024-09-30
Payer: COMMERCIAL

## 2024-09-30 VITALS
BODY MASS INDEX: 33.29 KG/M2 | HEIGHT: 64 IN | DIASTOLIC BLOOD PRESSURE: 78 MMHG | SYSTOLIC BLOOD PRESSURE: 118 MMHG | WEIGHT: 195 LBS

## 2024-09-30 DIAGNOSIS — N89.8 VAGINAL DISCHARGE: ICD-10-CM

## 2024-09-30 DIAGNOSIS — Z20.2 STD EXPOSURE: ICD-10-CM

## 2024-09-30 DIAGNOSIS — N89.8 VAGINAL IRRITATION: ICD-10-CM

## 2024-09-30 DIAGNOSIS — Z20.2 STD EXPOSURE: Primary | ICD-10-CM

## 2024-09-30 LAB
HBV SURFACE AG SER QL: NORMAL
HCV AB SER QL: NORMAL
HIV 1+2 AB+HIV1 P24 AG SERPL QL IA: NORMAL
HIV 2 AB SERPL QL IA: NORMAL
HIV1 AB SERPL QL IA: NORMAL
HIV1 P24 AG SERPL QL IA: NORMAL
TREPONEMA PALLIDUM IGG+IGM AB [PRESENCE] IN SERUM OR PLASMA BY IMMUNOASSAY: NORMAL

## 2024-09-30 PROCEDURE — 36415 COLL VENOUS BLD VENIPUNCTURE: CPT

## 2024-09-30 PROCEDURE — 87389 HIV-1 AG W/HIV-1&-2 AB AG IA: CPT

## 2024-09-30 PROCEDURE — 87340 HEPATITIS B SURFACE AG IA: CPT

## 2024-09-30 PROCEDURE — 87510 GARDNER VAG DNA DIR PROBE: CPT | Performed by: OBSTETRICS & GYNECOLOGY

## 2024-09-30 PROCEDURE — 87660 TRICHOMONAS VAGIN DIR PROBE: CPT | Performed by: OBSTETRICS & GYNECOLOGY

## 2024-09-30 PROCEDURE — 99214 OFFICE O/P EST MOD 30 MIN: CPT | Performed by: OBSTETRICS & GYNECOLOGY

## 2024-09-30 PROCEDURE — 87591 N.GONORRHOEAE DNA AMP PROB: CPT | Performed by: OBSTETRICS & GYNECOLOGY

## 2024-09-30 PROCEDURE — 87491 CHLMYD TRACH DNA AMP PROBE: CPT | Performed by: OBSTETRICS & GYNECOLOGY

## 2024-09-30 PROCEDURE — 86803 HEPATITIS C AB TEST: CPT

## 2024-09-30 PROCEDURE — 87480 CANDIDA DNA DIR PROBE: CPT | Performed by: OBSTETRICS & GYNECOLOGY

## 2024-09-30 PROCEDURE — 86780 TREPONEMA PALLIDUM: CPT

## 2024-09-30 RX ORDER — NYSTATIN AND TRIAMCINOLONE ACETONIDE 100000; 1 [USP'U]/G; MG/G
OINTMENT TOPICAL 2 TIMES DAILY
Qty: 30 G | Refills: 5 | Status: SHIPPED | OUTPATIENT
Start: 2024-09-30

## 2024-09-30 RX ORDER — CLOBETASOL PROPIONATE 0.5 MG/G
CREAM TOPICAL
COMMUNITY
Start: 2023-06-05 | End: 2024-09-30 | Stop reason: SDUPTHER

## 2024-09-30 RX ORDER — CLOBETASOL PROPIONATE 0.5 MG/G
CREAM TOPICAL 2 TIMES DAILY
Qty: 30 G | Refills: 5 | Status: SHIPPED | OUTPATIENT
Start: 2024-09-30 | End: 2024-10-10

## 2024-09-30 NOTE — TELEPHONE ENCOUNTER
Pt called in stating she was seen and tested for STDs this morning. After testing, she had oral intercourse with a partner who may have genital herpes. States she saw some lumps on his genitals however he told her that they are always there. Pt wondering if there is anything she should do at this time. Advised to monitor and call back with any rash/lesions. She verbalized understanding.

## 2024-09-30 NOTE — PROGRESS NOTES
Assessment:   Shital was seen today for vaginal odor.    Diagnoses and all orders for this visit:    STD exposure  -     Cancel: Chlamydia/GC amplified DNA by PCR  -     HIV 1/2 AG/AB w Reflex [facility] for 2 yr old and above; Future  -     RPR-Syphilis Screening (Total Syphilis IGG/IGM); Future  -     Hepatitis B surface antigen; Future  -     Hepatitis C antibody; Future  -     Chlamydia/GC amplified DNA by PCR    Vaginal irritation  -     Cancel: Vaginosis DNA Probe; Future  -     clobetasol (TEMOVATE) 0.05 % cream; Apply topically 2 (two) times a day for 10 days  -     nystatin-triamcinolone (MYCOLOG-II) ointment; Apply topically 2 (two) times a day    Vaginal discharge  -     Vaginosis DNA Probe        Plan:  1.  Vulvar irritation: Prescription for Mycolog-II ointment sent to patient's pharmacy instructions were reviewed.  Refill of clobetasol also sent to patient's pharmacy.  Patient counseled to use the Mycolog-II ointment x 10 days and then prn.   Pt to save the clobetasol for more extreme irritation.  Also discussed applying the clobetasol very sparingly when using it.  2.  Vaginal discharge: Affirm taken.  3.  STD testing: Gonorrhea and Chlamydia tested today.  Patient to present to the lab to test for HIV, RPR, hepatitis B and C.      Subjective:   Ms.Yacenia DARELL Jones is a 24 y.o. yo female who presents to discuss recurrent BV and for vulvar irritation. LMP 8/20/24.  Pt thinks she may have psoriasis.  Pt wants to be checked for BV and STD's.  Pt reports she gets BV after every menses.  Pt had intercourse 2 months ago.  Not a new partner.  No condoms.  Pt reports she has had issues with external rash which she was given clobetasol.  Patient is also tried with probiotics.  Patient reports when she uses the clobetasol, she thinks it is causing increased vaginal discharge.  Has also changed to sensitive skin, no fragrance soaps for personal wash.  No other changes in diet, activity or medications.      ROS:    She denies hematuria, dysuria, constipation, diarrhea, fevers, chills, nausea or emesis.    Patient Active Problem List   Diagnosis    Allergic rhinitis    Attention deficit hyperactivity disorder    Headache, migraine    Vitamin D deficiency    Thyromegaly    Right hand pain    Functional diarrhea    Left foot pain    Costochondritis    Precordial pain    Tibial pain    Leg swelling    Current episode of major depressive disorder without prior episode    Pain in both hands    Obesity (BMI 30-39.9)    Acute pain of left knee    Left shoulder pain    Lumbar radiculitis    Acute superficial gastritis without hemorrhage    Dysmenorrhea       Past Medical History:   Diagnosis Date    ADHD     Allergic     Anxiety     Depression     Migraine        Social History     Socioeconomic History    Marital status: Single     Spouse name: Not on file    Number of children: Not on file    Years of education: Not on file    Highest education level: Not on file   Occupational History    Occupation:    Tobacco Use    Smoking status: Never    Smokeless tobacco: Never   Vaping Use    Vaping status: Every Day    Substances: Nicotine, THC, Flavoring   Substance and Sexual Activity    Alcohol use: Yes    Drug use: Yes     Types: Marijuana    Sexual activity: Yes     Partners: Male     Birth control/protection: None   Other Topics Concern    Not on file   Social History Narrative    Not on file     Social Determinants of Health     Financial Resource Strain: Not on file   Food Insecurity: Not on file   Transportation Needs: Not on file   Physical Activity: Not on file   Stress: Not on file   Social Connections: Not on file   Intimate Partner Violence: Not on file   Housing Stability: Not on file         Current Outpatient Medications:     clobetasol (TEMOVATE) 0.05 % cream, Apply topically 2 (two) times a day for 10 days, Disp: 30 g, Rfl: 5    ibuprofen (MOTRIN) 600 mg tablet, TAKE 1 TABLET (600 MG TOTAL) BY MOUTH EVERY 6  "(SIX) HOURS AS NEEDED FOR MILD PAIN, Disp: 60 tablet, Rfl: 0    montelukast (SINGULAIR) 10 mg tablet, TAKE 1 TABLET BY MOUTH EVERY DAY, Disp: 90 tablet, Rfl: 0    nystatin-triamcinolone (MYCOLOG-II) ointment, Apply topically 2 (two) times a day, Disp: 30 g, Rfl: 5    omeprazole (PriLOSEC) 40 MG capsule, Take 1 capsule (40 mg total) by mouth daily before breakfast, Disp: 90 capsule, Rfl: 0    Ubrogepant (UBRELVY) 50 MG tablet, Take 1 tablet (50 mg) one time as needed for migraine. May repeat one additional tablet (50 mg) at least two hours after the first dose. Do not use more than two doses per day, or for more than eight days per month., Disp: 10 tablet, Rfl: 5    Allergies   Allergen Reactions    Kiwi Extract - Food Allergy     Other Other (See Comments)     Cherries, sesame seed, clams, bananas. Patient has itching in throat.    Peanut Oil - Food Allergy      Elevated titer with labs       /78   Ht 5' 4\" (1.626 m)   Wt 88.5 kg (195 lb)   LMP 09/20/2024   BMI 33.47 kg/m²     GEN: The patient was alert and oriented x3, pleasant well-appearing female in no acute distress.   Pelvic: Mild erythema of labia majora bilaterally, normal vaginal epithelium, normalappearing cervix. Scant discharge noted.           "

## 2024-10-01 DIAGNOSIS — N76.0 BACTERIAL VAGINOSIS: Primary | ICD-10-CM

## 2024-10-01 DIAGNOSIS — B96.89 BACTERIAL VAGINOSIS: Primary | ICD-10-CM

## 2024-10-01 LAB
C TRACH DNA SPEC QL NAA+PROBE: NEGATIVE
CANDIDA RRNA VAG QL PROBE: NOT DETECTED
G VAGINALIS RRNA GENITAL QL PROBE: DETECTED
N GONORRHOEA DNA SPEC QL NAA+PROBE: NEGATIVE
T VAGINALIS RRNA GENITAL QL PROBE: NOT DETECTED

## 2024-10-01 RX ORDER — METRONIDAZOLE 500 MG/1
500 TABLET ORAL EVERY 12 HOURS SCHEDULED
Qty: 14 TABLET | Refills: 0 | Status: SHIPPED | OUTPATIENT
Start: 2024-10-01 | End: 2024-10-08

## 2024-10-14 ENCOUNTER — OFFICE VISIT (OUTPATIENT)
Dept: URGENT CARE | Facility: MEDICAL CENTER | Age: 24
End: 2024-10-14
Payer: COMMERCIAL

## 2024-10-14 VITALS
OXYGEN SATURATION: 99 % | RESPIRATION RATE: 18 BRPM | HEART RATE: 86 BPM | DIASTOLIC BLOOD PRESSURE: 76 MMHG | TEMPERATURE: 98.2 F | SYSTOLIC BLOOD PRESSURE: 113 MMHG

## 2024-10-14 DIAGNOSIS — R19.7 DIARRHEA, UNSPECIFIED TYPE: Primary | ICD-10-CM

## 2024-10-14 PROCEDURE — 99213 OFFICE O/P EST LOW 20 MIN: CPT | Performed by: PHYSICIAN ASSISTANT

## 2024-10-14 NOTE — LETTER
October 14, 2024     Patient: Shital Jones  YOB: 2000  Date of Visit: 10/14/2024      To Whom it May Concern:    Shital Jones is under my professional care. Shital was seen in my office on 10/14/2024. Shital may return to work on 10/14 .    If you have any questions or concerns, please don't hesitate to call.         Sincerely,          Elsi Jain PA-C        CC: No Recipients

## 2024-10-14 NOTE — PROGRESS NOTES
[facility]      NAME: Shital Jones is a 24 y.o. female  : 2000    MRN: [id number]  DATE: 2024  TIME: 11:06 AM    Assessment and Plan   Diarrhea, unspecified type [R19.7]  1. Diarrhea, unspecified type            Patient Instructions   If the pain begins to localize especially in the RLQ or if the pain worsens, to present to the ER for further evaluation. Risks and benefits discussed. Patient understands and agrees with the plan.      If tests have been performed at [facility], our office will contact you with results if changes need to be made to the care plan discussed with you at the visit.  You can review your full results on [facility]'s MyChart.     Follow up with PCP in 3-5 days.      If any of the following occur, please report to your nearest ED for evaluation or call 911.   Difficultly breathing or shortness of breath  Chest pain  Acutely worsening symptoms.   To present to the ER if symptoms worsen.  Chief Complaint     Chief Complaint   Patient presents with    Diarrhea     Patient states that she was having diarrhea, nausea and stomach pains last night. She is unsure whether it is because she was constipated or out drinking on Saturday          History of Present Illness   Shital Jones presents to the clinic c/o    Diarrhea   This is a new problem. The current episode started more than 1 month ago (on and off for a month). The problem has been waxing and waning. The stool consistency is described as Watery. The patient states that diarrhea does not awaken her from sleep. Associated symptoms include abdominal pain. Pertinent negatives include no chills, coughing, fever, headaches or vomiting. The symptoms are aggravated by stress (just switched to night shift and she reports her anxiety has been increased lately (admits to recent break up and has been drinking on the weekends)). There are no known risk factors. She has tried nothing for the symptoms. The treatment provided no  relief. There is no history of a recent abdominal surgery.     Denies any uti symptoms. Denies any chance of pregnancy.     Review of Systems   Review of Systems   Constitutional:  Negative for chills, diaphoresis, fatigue and fever.   HENT:  Negative for congestion, ear discharge, ear pain and facial swelling.    Eyes:  Negative for photophobia, pain, discharge, redness, itching and visual disturbance.   Respiratory:  Negative for apnea, cough, chest tightness, shortness of breath and wheezing.    Cardiovascular:  Negative for chest pain and palpitations.   Gastrointestinal:  Positive for abdominal pain, diarrhea and nausea. Negative for blood in stool and vomiting.   Skin:  Negative for color change, rash and wound.   Neurological:  Negative for dizziness and headaches.   Hematological:  Negative for adenopathy.         Current Medications     Long-Term Medications   Medication Sig Dispense Refill    clobetasol (TEMOVATE) 0.05 % cream Apply topically 2 (two) times a day for 10 days 30 g 5    ibuprofen (MOTRIN) 600 mg tablet TAKE 1 TABLET (600 MG TOTAL) BY MOUTH EVERY 6 (SIX) HOURS AS NEEDED FOR MILD PAIN 60 tablet 0    montelukast (SINGULAIR) 10 mg tablet TAKE 1 TABLET BY MOUTH EVERY DAY 90 tablet 0    nystatin-triamcinolone (MYCOLOG-II) ointment Apply topically 2 (two) times a day 30 g 5    omeprazole (PriLOSEC) 40 MG capsule Take 1 capsule (40 mg total) by mouth daily before breakfast 90 capsule 0       Current Allergies     Allergies as of 10/14/2024 - Reviewed 10/14/2024   Allergen Reaction Noted    Kiwi extract - food allergy  07/09/2018    Other Other (See Comments) 04/08/2019    Peanut oil - food allergy  04/06/2015            The following portions of the patient's history were reviewed and updated as appropriate: allergies, current medications, past family history, past medical history, past social history, past surgical history and problem list.  Past Medical History:   Diagnosis Date    ADHD      Allergic     Anxiety     Depression     Migraine      Past Surgical History:   Procedure Laterality Date    WISDOM TOOTH EXTRACTION       Social History     Socioeconomic History    Marital status: Single     Spouse name: Not on file    Number of children: Not on file    Years of education: Not on file    Highest education level: Not on file   Occupational History    Occupation:    Tobacco Use    Smoking status: Never    Smokeless tobacco: Never   Vaping Use    Vaping status: Every Day    Substances: Nicotine, THC, Flavoring   Substance and Sexual Activity    Alcohol use: Yes    Drug use: Yes     Types: Marijuana    Sexual activity: Yes     Partners: Male     Birth control/protection: None   Other Topics Concern    Not on file   Social History Narrative    Not on file     Social Determinants of Health     Financial Resource Strain: Not on file   Food Insecurity: Not on file   Transportation Needs: Not on file   Physical Activity: Not on file   Stress: Not on file   Social Connections: Not on file   Intimate Partner Violence: Not on file   Housing Stability: Not on file       Objective   /76   Pulse 86   Temp 98.2 °F (36.8 °C)   Resp 18   LMP 09/25/2024 (Approximate)   SpO2 99%       Physical Exam     Physical Exam  Vitals and nursing note reviewed.   Constitutional:       General: She is not in acute distress.     Appearance: She is well-developed. She is not diaphoretic.   HENT:      Head: Normocephalic and atraumatic.      Right Ear: Tympanic membrane and external ear normal.      Left Ear: Tympanic membrane and external ear normal.      Nose: Nose normal.      Mouth/Throat:      Mouth: Mucous membranes are moist.      Pharynx: No oropharyngeal exudate or posterior oropharyngeal erythema.   Eyes:      General: No scleral icterus.        Right eye: No discharge.         Left eye: No discharge.      Conjunctiva/sclera: Conjunctivae normal.   Cardiovascular:      Rate and Rhythm: Normal rate  and regular rhythm.      Heart sounds: Normal heart sounds. No murmur heard.     No friction rub. No gallop.   Pulmonary:      Effort: Pulmonary effort is normal. No respiratory distress.      Breath sounds: Normal breath sounds. No decreased breath sounds, wheezing, rhonchi or rales.   Abdominal:      General: Bowel sounds are normal. There is no distension.      Palpations: Abdomen is soft.      Tenderness: There is no abdominal tenderness. There is no right CVA tenderness, left CVA tenderness, guarding or rebound.   Skin:     General: Skin is warm and dry.      Coloration: Skin is not pale.      Findings: No erythema or rash.   Neurological:      Mental Status: She is alert and oriented to person, place, and time.   Psychiatric:         Behavior: Behavior normal.         Thought Content: Thought content normal.         Judgment: Judgment normal.         Elsi Jain PA-C

## 2024-12-30 ENCOUNTER — OFFICE VISIT (OUTPATIENT)
Age: 24
End: 2024-12-30
Payer: COMMERCIAL

## 2024-12-30 VITALS
BODY MASS INDEX: 31.07 KG/M2 | HEIGHT: 64 IN | DIASTOLIC BLOOD PRESSURE: 82 MMHG | WEIGHT: 182 LBS | SYSTOLIC BLOOD PRESSURE: 138 MMHG | TEMPERATURE: 98.1 F

## 2024-12-30 DIAGNOSIS — Z00.00 WELL ADULT EXAM: ICD-10-CM

## 2024-12-30 DIAGNOSIS — K64.0 GRADE I HEMORRHOIDS: ICD-10-CM

## 2024-12-30 DIAGNOSIS — G43.009 MIGRAINE WITHOUT AURA AND WITHOUT STATUS MIGRAINOSUS, NOT INTRACTABLE: Primary | ICD-10-CM

## 2024-12-30 PROCEDURE — 99395 PREV VISIT EST AGE 18-39: CPT | Performed by: FAMILY MEDICINE

## 2024-12-30 PROCEDURE — 99213 OFFICE O/P EST LOW 20 MIN: CPT | Performed by: FAMILY MEDICINE

## 2024-12-30 RX ORDER — IBUPROFEN 600 MG/1
600 TABLET, FILM COATED ORAL EVERY 6 HOURS PRN
Qty: 60 TABLET | Refills: 0 | Status: SHIPPED | OUTPATIENT
Start: 2024-12-30

## 2024-12-30 RX ORDER — HYDROCORTISONE 25 MG/G
CREAM TOPICAL 2 TIMES DAILY
Qty: 28 G | Refills: 1 | Status: SHIPPED | OUTPATIENT
Start: 2024-12-30

## 2024-12-30 NOTE — PROGRESS NOTES
"Assessment/Plan:       Diagnoses and all orders for this visit:    Well adult exam  Comments:  Completed.  Patient will consider Adacel shot in the future.    Migraine without aura and without status migrainosus, not intractable  -     ibuprofen (MOTRIN) 600 mg tablet; Take 1 tablet (600 mg total) by mouth every 6 (six) hours as needed for mild pain    Grade I hemorrhoids            Subjective:        Patient ID: Shital Jones is a 24 y.o. female.      Patient is here for wellness exam.  Labs and vaccines reviewed.  Patient up-to-date seeing gynecologist.  Patient's migraines have been stable overall.  Patient needs refill on Motrin.  Patient with hemorrhoid over the past month or so.  Patient using Preparation H.          The following portions of the patient's history were reviewed and updated as appropriate: allergies, current medications, past family history, past medical history, past social history, past surgical history and problem list.      Review of Systems   Constitutional: Negative.    HENT: Negative.     Eyes: Negative.    Respiratory: Negative.     Cardiovascular: Negative.    Gastrointestinal:         Hemorrhoid   Endocrine: Negative.    Genitourinary: Negative.    Musculoskeletal: Negative.    Skin: Negative.    Allergic/Immunologic: Negative.    Neurological:  Positive for headaches.   Hematological: Negative.    Psychiatric/Behavioral: Negative.             Objective:               /82 (BP Location: Right arm, Patient Position: Sitting, Cuff Size: Standard)   Temp 98.1 °F (36.7 °C) (Temporal)   Ht 5' 4\" (1.626 m)   Wt 82.6 kg (182 lb)   LMP 12/09/2024 (Approximate)   BMI 31.24 kg/m²          Physical Exam  Vitals and nursing note reviewed.   Constitutional:       General: She is not in acute distress.     Appearance: Normal appearance. She is not ill-appearing, toxic-appearing or diaphoretic.   HENT:      Head: Normocephalic and atraumatic.      Right Ear: Tympanic membrane, ear canal " and external ear normal. There is no impacted cerumen.      Left Ear: Tympanic membrane, ear canal and external ear normal. There is no impacted cerumen.      Nose: Nose normal. No congestion or rhinorrhea.      Mouth/Throat:      Mouth: Mucous membranes are moist.      Pharynx: No oropharyngeal exudate or posterior oropharyngeal erythema.   Eyes:      General: No scleral icterus.        Right eye: No discharge.         Left eye: No discharge.   Neck:      Vascular: No carotid bruit.   Cardiovascular:      Rate and Rhythm: Normal rate and regular rhythm.      Pulses: Normal pulses.      Heart sounds: Normal heart sounds. No murmur heard.     No friction rub. No gallop.   Pulmonary:      Effort: Pulmonary effort is normal. No respiratory distress.      Breath sounds: Normal breath sounds. No stridor. No wheezing, rhonchi or rales.   Chest:      Chest wall: No tenderness.   Musculoskeletal:         General: No swelling, tenderness, deformity or signs of injury. Normal range of motion.      Cervical back: Normal range of motion and neck supple. No rigidity. No muscular tenderness.      Right lower leg: No edema.      Left lower leg: No edema.   Lymphadenopathy:      Cervical: No cervical adenopathy.   Skin:     General: Skin is warm and dry.      Capillary Refill: Capillary refill takes less than 2 seconds.      Coloration: Skin is not jaundiced.      Findings: No bruising, erythema, lesion or rash.   Neurological:      Mental Status: She is alert and oriented to person, place, and time. Mental status is at baseline.      Cranial Nerves: No cranial nerve deficit.      Sensory: No sensory deficit.      Motor: No weakness.      Coordination: Coordination normal.      Gait: Gait normal.   Psychiatric:         Mood and Affect: Mood normal.         Behavior: Behavior normal.         Thought Content: Thought content normal.         Judgment: Judgment normal.

## 2025-01-24 ENCOUNTER — TELEPHONE (OUTPATIENT)
Age: 25
End: 2025-01-24

## 2025-01-24 NOTE — TELEPHONE ENCOUNTER
VM informing patient appointment on 1/30/25 with Dr Babb is now at [facility], [address]

## 2025-01-29 NOTE — PROGRESS NOTES
Diagnoses and all orders for this visit:    Anal fissure  -     NIFEdipine 0.3%-lidocaine 5% rectal ointment; Apply 1 Application topically every 4 (four) hours as needed for discomfort or pain Apply a small amount to anal fissure       Anal fissure  Patient presents for evaluation of anorectal pain.  Patient notes that starting approxi-2 months ago she had a change in her bowel habits.  After this she had significant anorectal pain which is described as burning to sharp and tearing.  She notes that this persisted with bowel movements over this period of time.  Examination shows a small 2 mm left anal margin skin tag as well as a painful posterior anal fissure.    Given her symptoms and exam, I suspect that her anal fissure is the cause of this.  We discussed treatment.  I recommended high-fiber diet with topical nifedipine.  I will see her back in 2 months to monitor progress.      FAY Jones is a 25 y.o. female referred for evaluation today by Dr. Jv Morley DO for hemorrhoids.    Lab Results   Component Value Date    SODIUM 138 02/07/2024    K 3.8 02/07/2024     02/07/2024    CO2 24 02/07/2024    AGAP 11 02/07/2024    BUN 10 02/07/2024    CREATININE 0.83 02/07/2024    GLUC 99 07/11/2023    GLUF 90 02/07/2024    CALCIUM 9.7 02/07/2024    AST 16 02/07/2024    ALT 17 02/07/2024    ALKPHOS 86 02/07/2024    TP 8.0 02/07/2024    TBILI 0.61 02/07/2024    EGFR 99 02/07/2024     Lab Results   Component Value Date    WBC 8.96 02/07/2024    HGB 11.8 02/07/2024    HCT 37.1 02/07/2024    MCV 81 (L) 02/07/2024     (H) 02/07/2024         Past Medical History:   Diagnosis Date    ADHD     Allergic     Anxiety     Depression     Migraine      Past Surgical History:   Procedure Laterality Date    WISDOM TOOTH EXTRACTION         Current Outpatient Medications:     clobetasol (TEMOVATE) 0.05 % cream, Apply topically 2 (two) times a day for 10 days, Disp: 30 g, Rfl: 5    hydrocortisone (ANUSOL-HC) 2.5 %  rectal cream, Apply topically 2 (two) times a day, Disp: 28 g, Rfl: 1    ibuprofen (MOTRIN) 600 mg tablet, Take 1 tablet (600 mg total) by mouth every 6 (six) hours as needed for mild pain, Disp: 60 tablet, Rfl: 0    montelukast (SINGULAIR) 10 mg tablet, TAKE 1 TABLET BY MOUTH EVERY DAY, Disp: 90 tablet, Rfl: 0    NIFEdipine 0.3%-lidocaine 5% rectal ointment, Apply 1 Application topically every 4 (four) hours as needed for discomfort or pain Apply a small amount to anal fissure, Disp: 50 g, Rfl: 2    nystatin-triamcinolone (MYCOLOG-II) ointment, Apply topically 2 (two) times a day, Disp: 30 g, Rfl: 5    omeprazole (PriLOSEC) 40 MG capsule, Take 1 capsule (40 mg total) by mouth daily before breakfast (Patient not taking: Reported on 1/30/2025), Disp: 90 capsule, Rfl: 0    Ubrogepant (UBRELVY) 50 MG tablet, Take 1 tablet (50 mg) one time as needed for migraine. May repeat one additional tablet (50 mg) at least two hours after the first dose. Do not use more than two doses per day, or for more than eight days per month. (Patient not taking: Reported on 1/30/2025), Disp: 10 tablet, Rfl: 5  Allergies as of 01/30/2025 - Reviewed 01/30/2025   Allergen Reaction Noted    Kiwi extract - food allergy  07/09/2018    Other Other (See Comments) 04/08/2019    Peanut oil - food allergy  04/06/2015     Review of Systems   All other systems reviewed and are negative.    There were no vitals filed for this visit.  Physical Exam  Constitutional:       Appearance: Normal appearance.   HENT:      Head: Normocephalic and atraumatic.   Eyes:      Extraocular Movements: Extraocular movements intact.      Pupils: Pupils are equal, round, and reactive to light.   Pulmonary:      Effort: Pulmonary effort is normal.   Genitourinary:     Rectum: Anal fissure present.   Musculoskeletal:         General: Normal range of motion.   Skin:     General: Skin is warm and dry.   Neurological:      General: No focal deficit present.      Mental Status:  She is alert and oriented to person, place, and time.   Psychiatric:         Mood and Affect: Mood normal.         Behavior: Behavior normal.         Thought Content: Thought content normal.         Judgment: Judgment normal.

## 2025-01-30 ENCOUNTER — OFFICE VISIT (OUTPATIENT)
Age: 25
End: 2025-01-30
Payer: COMMERCIAL

## 2025-01-30 VITALS — BODY MASS INDEX: 30.9 KG/M2 | HEIGHT: 64 IN | WEIGHT: 181 LBS

## 2025-01-30 DIAGNOSIS — K60.2 ANAL FISSURE: Primary | ICD-10-CM

## 2025-01-30 PROCEDURE — 99243 OFF/OP CNSLTJ NEW/EST LOW 30: CPT | Performed by: COLON & RECTAL SURGERY

## 2025-01-30 NOTE — ASSESSMENT & PLAN NOTE
Patient presents for evaluation of anorectal pain.  Patient notes that starting approxi-2 months ago she had a change in her bowel habits.  After this she had significant anorectal pain which is described as burning to sharp and tearing.  She notes that this persisted with bowel movements over this period of time.  Examination shows a small 2 mm left anal margin skin tag as well as a painful posterior anal fissure.    Given her symptoms and exam, I suspect that her anal fissure is the cause of this.  We discussed treatment.  I recommended high-fiber diet with topical nifedipine.  I will see her back in 2 months to monitor progress.

## 2025-03-13 ENCOUNTER — TELEPHONE (OUTPATIENT)
Age: 25
End: 2025-03-13

## 2025-03-19 ENCOUNTER — NURSE TRIAGE (OUTPATIENT)
Age: 25
End: 2025-03-19

## 2025-03-19 NOTE — TELEPHONE ENCOUNTER
FOLLOW UP: First available appointment scheduled for 4/9. Routing to clinical pool for follow-up if sooner appointment becomes available.    REASON FOR CONVERSATION: Breast Mass    SYMPTOMS: left breast mass about size of quarter - painful around menses. Also has similar right breast mass but not painful.     OTHER: lumps in breast present x1 year. Denies additional breast changes.     DISPOSITION: See Within 3 Days in Office

## 2025-03-19 NOTE — TELEPHONE ENCOUNTER
"Reason for Disposition  • Breast lump    Answer Assessment - Initial Assessment Questions  1. SYMPTOM: \"What's the main symptom you're concerned about?\"  (e.g., lump, nipple discharge, pain, rash )      Lump in left breast, present for 1 year, painful around menses. Also has similar mass in right breast, denies pain.   2. LOCATION: \"Where is the mass located?\"      Right and left breast  3. ONSET: \"When did symptoms  start?\"      1 year ago  4. PRIOR HISTORY: \"Do you have any history of prior problems with your breasts?\" (e.g., breast cancer, breast implant, fibrocystic breast disease)      denies  5. CAUSE: \"What do you think is causing this symptom?\"      unknown  6. OTHER SYMPTOMS: \"Do you have any other symptoms?\" (e.g., fever, breast pain, nipple discharge, redness or rash)      denies  7. PREGNANCY-BREASTFEEDING: \"Is there any chance you are pregnant?\" \"When was your last menstrual period?\" \"Are you breastfeeding?\"      denies    Protocols used: Breast Symptoms-Adult-[place]    "

## 2025-03-19 NOTE — TELEPHONE ENCOUNTER
Regarding: pt scheduled by my chart and has lump on her breast  ----- Message from Elizabeth DEVLIN sent at 3/19/2025 10:47 AM EDT -----  Patient scheduled by my chart for her yearly for a lump in her breast in the end of April. Can you please check if she should be seen sooner.  She can be reached at [phone]. Thank you

## 2025-03-19 NOTE — TELEPHONE ENCOUNTER
Offered pt multiple sooner appts but she declined and said she is fine to wait until the 9th of April.

## 2025-03-31 ENCOUNTER — TELEPHONE (OUTPATIENT)
Age: 25
End: 2025-03-31

## 2025-03-31 DIAGNOSIS — K60.2 ANAL FISSURE: ICD-10-CM

## 2025-03-31 DIAGNOSIS — N89.8 VAGINAL IRRITATION: ICD-10-CM

## 2025-03-31 NOTE — TELEPHONE ENCOUNTER
Patients GI provider:  Dr. Babb    Number to return call: [phone]    Reason for call: Pt calling to ask for refill on NIFEdipine 0.3%-lidocaine 5% rectal ointment , states it is from [facility].      Scheduled procedure/appointment date if applicable: 4/30/25

## 2025-04-01 NOTE — TELEPHONE ENCOUNTER
Called in another prescription for Nifedipine-lidocaine ointment to [facility] with NR. The patient has a f/u scheduled for 4/30/25.

## 2025-04-01 NOTE — TELEPHONE ENCOUNTER
Called patient to inquire about which pharmacy she would like the prescription called into. She requested that it is sent to [facility] pharmacy.

## 2025-04-09 ENCOUNTER — OFFICE VISIT (OUTPATIENT)
Dept: OBGYN CLINIC | Facility: MEDICAL CENTER | Age: 25
End: 2025-04-09
Payer: COMMERCIAL

## 2025-04-09 VITALS
WEIGHT: 181.7 LBS | HEIGHT: 64 IN | DIASTOLIC BLOOD PRESSURE: 74 MMHG | SYSTOLIC BLOOD PRESSURE: 112 MMHG | BODY MASS INDEX: 31.02 KG/M2

## 2025-04-09 DIAGNOSIS — N63.21 MASS OF UPPER OUTER QUADRANT OF LEFT BREAST: Primary | ICD-10-CM

## 2025-04-09 PROCEDURE — 99213 OFFICE O/P EST LOW 20 MIN: CPT | Performed by: OBSTETRICS & GYNECOLOGY

## 2025-04-09 NOTE — PROGRESS NOTES
Assessment:   Shital was seen today for breast mass.    Diagnoses and all orders for this visit:    Mass of upper outer quadrant of left breast  -     US breast left limited (diagnostic); Future        Plan:  Breast ultrasound ordered.  Patient recommended to try with supportive bras and to consider vitamin E supplementation.  Patient may also try evening primrose oil.  All questions answered.    Subjective:    Patient is a 25 y.o. y.o. Female who presents for a problem visit.  Pt is c/o + breast pain and + breast lump.  Sx's are in the left side.  Sx's started 2 weeks ago.  Pt reports pain is worse with her menses.  Pt reports - changes to her diet.  She drinks 1 caffeinated products daily.  Pt reports - breast trauma.  Pt reports - changes in diet and activity.  Pt reports - nipple discharge.+SA     Patient Active Problem List   Diagnosis    Allergic rhinitis    Attention deficit hyperactivity disorder    Headache, migraine    Vitamin D deficiency    Thyromegaly    Right hand pain    Functional diarrhea    Left foot pain    Costochondritis    Precordial pain    Tibial pain    Leg swelling    Current episode of major depressive disorder without prior episode    Pain in both hands    Obesity (BMI 30-39.9)    Acute pain of left knee    Left shoulder pain    Lumbar radiculitis    Acute superficial gastritis without hemorrhage    Dysmenorrhea    Anal fissure       Past Medical History:   Diagnosis Date    ADHD     Allergic     Anxiety     Depression     Migraine        Past Surgical History:   Procedure Laterality Date    WISDOM TOOTH EXTRACTION         History reviewed. No pertinent family history.    Social History     Socioeconomic History    Marital status: Single     Spouse name: Not on file    Number of children: Not on file    Years of education: Not on file    Highest education level: Not on file   Occupational History    Occupation:    Tobacco Use    Smoking status: Never    Smokeless tobacco:  Never   Vaping Use    Vaping status: Every Day    Substances: Nicotine, THC, Flavoring   Substance and Sexual Activity    Alcohol use: Yes    Drug use: Yes     Types: Marijuana    Sexual activity: Yes     Partners: Male     Birth control/protection: None   Other Topics Concern    Not on file   Social History Narrative    Not on file     Social Drivers of Health     Financial Resource Strain: Not on file   Food Insecurity: Not on file   Transportation Needs: Not on file   Physical Activity: Not on file   Stress: Not on file   Social Connections: Not on file   Intimate Partner Violence: Not on file   Housing Stability: Not on file         Current Outpatient Medications:     hydrocortisone (ANUSOL-HC) 2.5 % rectal cream, Apply topically 2 (two) times a day, Disp: 28 g, Rfl: 1    ibuprofen (MOTRIN) 600 mg tablet, Take 1 tablet (600 mg total) by mouth every 6 (six) hours as needed for mild pain, Disp: 60 tablet, Rfl: 0    montelukast (SINGULAIR) 10 mg tablet, TAKE 1 TABLET BY MOUTH EVERY DAY, Disp: 90 tablet, Rfl: 0    NIFEdipine 0.3%-lidocaine 5% rectal ointment, Apply 1 Application topically every 4 (four) hours as needed for discomfort or pain Apply a small amount to anal fissure, Disp: 50 g, Rfl: 2    nystatin-triamcinolone (MYCOLOG-II) ointment, Apply topically 2 (two) times a day, Disp: 30 g, Rfl: 5    clobetasol (TEMOVATE) 0.05 % cream, Apply topically 2 (two) times a day for 10 days, Disp: 30 g, Rfl: 5    omeprazole (PriLOSEC) 40 MG capsule, Take 1 capsule (40 mg total) by mouth daily before breakfast (Patient not taking: Reported on 1/30/2025), Disp: 90 capsule, Rfl: 0    Ubrogepant (UBRELVY) 50 MG tablet, Take 1 tablet (50 mg) one time as needed for migraine. May repeat one additional tablet (50 mg) at least two hours after the first dose. Do not use more than two doses per day, or for more than eight days per month. (Patient not taking: Reported on 1/30/2025), Disp: 10 tablet, Rfl: 5    Allergies   Allergen  Reactions    Kiwi Extract - Food Allergy     Other Other (See Comments)     Cherries, sesame seed, clams, bananas. Patient has itching in throat.    Peanut Oil - Food Allergy      Elevated titer with labs       Review of Systems  Constitutional :no fever, feels well, no tiredness, no recent weight gain or loss  ENT: no ear ache, no loss of hearing, no nosebleeds or nasal discharge, no sore throat or hoarseness.  Cardiovascular: no complaints of slow or fast heart beat, no chest pain, no palpitations, no leg claudication or lower extremity edema.  Respiratory: no complaints of shortness of shortness of breath, no MEYERS  Breasts: +complaints of breast pain, breast lump, or nipple discharge  Gastrointestinal: no complaints of abdominal pain, constipation, nausea, vomiting, or diarrhea or bloody stools  Genitourinary : no complaints of dysuria, incontinence, pelvic pain, no dysmenorrhea, vaginal discharge or abnormal vaginal bleeding and as noted in HPI.       Musculoskeletal: no complaints of arthralgia, no myalgia, no joint swelling or            stiffness, no limb pain or swelling.  Integumentary: no complaints of skin rash or lesion, itching or dry skin  Neurological: no complaints of headache, no confusion, no numbness or tingling, no dizziness or fainting    Constitutional   General appearance: No acute distress, well appearing and well nourished.    Psychiatric   Orientation to person, place, and time: Normal.    Mood and affect: Normal.    Breast  Breasts: right breast normal without mass, skin or nipple changes or axillary nodes; sm f-c changes along outer quad, left breast normal without skin or nipple changes or axillary nodes.  + An approximately 3 cm area around the 9 o'clock position that is somewhat flat and mobile

## 2025-04-09 NOTE — PATIENT INSTRUCTIONS
Breast discomfort: Recommend supportive bras, specifically sports bras.  You can also start vitamin E supplementation and using evening primrose oil.

## 2025-05-06 ENCOUNTER — RESULTS FOLLOW-UP (OUTPATIENT)
Dept: OBGYN CLINIC | Facility: MEDICAL CENTER | Age: 25
End: 2025-05-06

## 2025-05-06 ENCOUNTER — HOSPITAL ENCOUNTER (OUTPATIENT)
Dept: ULTRASOUND IMAGING | Facility: CLINIC | Age: 25
Discharge: HOME | End: 2025-05-06
Payer: COMMERCIAL

## 2025-05-06 VITALS — HEIGHT: 64 IN | BODY MASS INDEX: 30.9 KG/M2 | WEIGHT: 181 LBS

## 2025-05-06 DIAGNOSIS — N63.21 MASS OF UPPER OUTER QUADRANT OF LEFT BREAST: Primary | ICD-10-CM

## 2025-05-06 DIAGNOSIS — N63.21 MASS OF UPPER OUTER QUADRANT OF LEFT BREAST: ICD-10-CM

## 2025-05-06 PROCEDURE — 76642 ULTRASOUND BREAST LIMITED: CPT

## 2025-05-06 NOTE — RESULT ENCOUNTER NOTE
Please notify pt of abnormal result: probable benign mass seen on left breast ultrasound.  Recommend repeat ultrasound in 6 months.

## 2025-05-13 ENCOUNTER — APPOINTMENT (OUTPATIENT)
Dept: LAB | Facility: CLINIC | Age: 25
End: 2025-05-13
Payer: COMMERCIAL

## 2025-05-13 ENCOUNTER — ANNUAL EXAM (OUTPATIENT)
Dept: OBGYN CLINIC | Facility: CLINIC | Age: 25
End: 2025-05-13
Payer: COMMERCIAL

## 2025-05-13 ENCOUNTER — TELEPHONE (OUTPATIENT)
Age: 25
End: 2025-05-13

## 2025-05-13 VITALS
HEIGHT: 64 IN | DIASTOLIC BLOOD PRESSURE: 72 MMHG | WEIGHT: 180 LBS | BODY MASS INDEX: 30.73 KG/M2 | SYSTOLIC BLOOD PRESSURE: 124 MMHG

## 2025-05-13 DIAGNOSIS — L73.2 HYDRADENITIS: ICD-10-CM

## 2025-05-13 DIAGNOSIS — Z11.3 SCREEN FOR STD (SEXUALLY TRANSMITTED DISEASE): ICD-10-CM

## 2025-05-13 DIAGNOSIS — N63.11 MASS OF UPPER OUTER QUADRANT OF RIGHT BREAST: Primary | ICD-10-CM

## 2025-05-13 DIAGNOSIS — Z01.419 ENCOUNTER FOR WELL WOMAN EXAM WITH ROUTINE GYNECOLOGICAL EXAM: ICD-10-CM

## 2025-05-13 PROCEDURE — 87591 N.GONORRHOEAE DNA AMP PROB: CPT

## 2025-05-13 PROCEDURE — 36415 COLL VENOUS BLD VENIPUNCTURE: CPT

## 2025-05-13 PROCEDURE — 87491 CHLMYD TRACH DNA AMP PROBE: CPT

## 2025-05-13 PROCEDURE — 87340 HEPATITIS B SURFACE AG IA: CPT

## 2025-05-13 PROCEDURE — 87389 HIV-1 AG W/HIV-1&-2 AB AG IA: CPT

## 2025-05-13 PROCEDURE — G0145 SCR C/V CYTO,THINLAYER,RESCR: HCPCS | Performed by: OBSTETRICS & GYNECOLOGY

## 2025-05-13 PROCEDURE — 86780 TREPONEMA PALLIDUM: CPT

## 2025-05-13 PROCEDURE — S0612 ANNUAL GYNECOLOGICAL EXAMINA: HCPCS | Performed by: OBSTETRICS & GYNECOLOGY

## 2025-05-13 NOTE — TELEPHONE ENCOUNTER
Received call from Patient for CONSULT PG - Hydradenitis. We have referral to Dr. Hernandez. Informed Patient that Dr. Hernandez's first avail is 2/2026. Offered first avail all locations.     Patient verbalized she will reach back out to her PCP before scheduling.

## 2025-05-13 NOTE — PROGRESS NOTES
"Name: Shital Jones      : 2000      MRN: [id number]  Encounter Provider: Ryann Snider MD  Encounter Date: 2025   Encounter department: [facility]  :  Assessment & Plan  Mass of upper outer quadrant of right breast    Orders:    US breast right limited (diagnostic); Future  suspect this is a fibroadenoma  Hydradenitis    Orders:    Ambulatory Referral to Dermatology; Future    Screen for STD (sexually transmitted disease)    Orders:    HIV 1/2 AG/AB w Reflex [facility] for 2 yr old and above; Future    RPR-Syphilis Screening (Total Syphilis IGG/IGM); Future    Chlamydia/GC amplified DNA by PCR; Future    Hepatitis B surface antigen; Future    Encounter for well woman exam with routine gynecological exam    Orders:    Liquid-based pap, screening    States feels safe at home   Desires  STI testing  / states not  sexually active     History of Present Illness   HPI  Shital Jones is a 25 y.o. female who presents for annual GYN exam , states now her  right breast she can feel  a mass  / desires  STI testing    History obtained from: patient    Review of Systems   HENT: Negative.     Respiratory: Negative.     Cardiovascular: Negative.    Gastrointestinal: Negative.    Genitourinary: Negative.    Musculoskeletal: Negative.    Neurological: Negative.    Psychiatric/Behavioral: Negative.            Objective   /72   Ht 5' 4\" (1.626 m)   Wt 81.6 kg (180 lb)   LMP 05/10/2025 (Exact Date)   BMI 30.90 kg/m²      Physical Exam  Constitutional:       Appearance: Normal appearance.   HENT:      Head: Normocephalic and atraumatic.      Nose: Nose normal.   Eyes:      Conjunctiva/sclera: Conjunctivae normal.   Pulmonary:      Effort: Pulmonary effort is normal.   Chest:   Breasts:     Right: Mass present. No swelling, bleeding, inverted nipple, nipple discharge, skin change or tenderness.      Left: Mass present. No swelling, bleeding, inverted nipple, nipple " discharge, skin change or tenderness.          Comments: Right breast palpable lesion   Left breast  palpable lesion consistent with  breast US  - suspect  fibroadenoma vs cyst - has follow  up scheduled   Abdominal:      General: There is no distension.      Palpations: Abdomen is soft. There is no mass.      Tenderness: There is no abdominal tenderness. There is no guarding or rebound.      Hernia: No hernia is present.   Genitourinary:     General: Normal vulva.      Vagina: Normal.      Cervix: Normal.      Uterus: Normal.       Adnexa: Right adnexa normal and left adnexa normal.   Neurological:      General: No focal deficit present.      Mental Status: She is alert and oriented to person, place, and time.   Psychiatric:         Mood and Affect: Mood normal.         Behavior: Behavior normal.

## 2025-05-14 ENCOUNTER — RESULTS FOLLOW-UP (OUTPATIENT)
Dept: OBGYN CLINIC | Facility: MEDICAL CENTER | Age: 25
End: 2025-05-14

## 2025-05-14 LAB
C TRACH DNA SPEC QL NAA+PROBE: NEGATIVE
HBV SURFACE AG SER QL: NORMAL
HIV 1+2 AB+HIV1 P24 AG SERPL QL IA: NORMAL
N GONORRHOEA DNA SPEC QL NAA+PROBE: NEGATIVE
TREPONEMA PALLIDUM IGG+IGM AB [PRESENCE] IN SERUM OR PLASMA BY IMMUNOASSAY: NORMAL

## 2025-05-20 LAB
LAB AP GYN PRIMARY INTERPRETATION: NORMAL
Lab: NORMAL

## 2025-05-29 ENCOUNTER — NURSE TRIAGE (OUTPATIENT)
Age: 25
End: 2025-05-29

## 2025-05-29 DIAGNOSIS — B00.9 HSV INFECTION: Primary | ICD-10-CM

## 2025-05-29 RX ORDER — VALACYCLOVIR HYDROCHLORIDE 500 MG/1
500 TABLET, FILM COATED ORAL 2 TIMES DAILY
Qty: 6 TABLET | Refills: 0 | Status: SHIPPED | OUTPATIENT
Start: 2025-05-29 | End: 2025-06-04

## 2025-05-29 NOTE — TELEPHONE ENCOUNTER
"REASON FOR CONVERSATION: Vaginal Pain    SYMPTOMS: Vaginal itching and red dots on vaginal area that are blister-like     OTHER HEALTH INFORMATION: History of +HSV antibodies. Has been prescribed Valtrex previously.     PROTOCOL DISPOSITION: Order Prescription (overriding See Today in Office)    CARE ADVICE PROVIDED: Call back if symptoms worsen or do not improve with use of Valtrex.    PRACTICE FOLLOW-UP: N/A          For Genital Herpes:      Valacyclovir 500mg BID PO x 3 days with no refills.   If patient insurance does not cover option one, order: Acyclovir 800mg BID PO x 5 days with no refills.    For Cold Sores:     Valacyclovir 2000mg Q12H PO x 1 day with no refills    Instructions:  This medication should be started within 72 hours of symptom onset.    If patient would like to discuss suppression or if all refills have been used, please offer first available appointment.          Reason for Disposition   Rash with painful tiny water blisters    Answer Assessment - Initial Assessment Questions  1. SYMPTOM: \"What's the main symptom you're concerned about?\" (e.g., pain, itching, dryness)      Itching and red spots on vaginal area, blister like.  2. LOCATION: \"Where is the  symptoms located?\" (e.g., inside/outside, left/right)      vagina  3. ONSET: \"When did the  symptoms  start?\"      Last night 5/28  4. PAIN: \"Is there any pain?\" If Yes, ask: \"How bad is it?\" (Scale: 1-10; mild, moderate, severe)      Itching   5. ITCHING: \"Is there any itching?\" If Yes, ask: \"How bad is it?\" (Scale: 1-10; mild, moderate, severe)      Yes, itching  6. CAUSE: \"What do you think is causing the discharge?\" \"Have you had the same problem before?\" \"What happened then?\"      HSV  8. PREGNANCY: \"Is there any chance you are pregnant?\" \"When was your last menstrual period?\"      Lmp 5/13    Protocols used: Vaginal Symptoms-Adult-[place]    "

## 2025-06-02 NOTE — PROGRESS NOTES
Diagnoses and all orders for this visit:    Anal fissure       Anal fissure  Patient presents for follow-up of anal fissure.  She has been using nifedipine regularly, recently she has been using it more on an as-needed basis.  She notes her symptoms of bleeding and anorectal pain have resolved.  Examination shows a healed post anal fissure.    At this point in time I recommend cessation of the topical nifedipine and bowel regimen.  We discussed that this can be retreated in the future if symptoms recur.  If that is the case and symptoms persist she should be return for follow-up.  As she has no ongoing symptoms at present of anal pain or rectal bleeding, I recommend only observation.      FAY Jones is a 25 y.o. female who presents for a follow up for anal fissure. Patient was last seen in the office on 1/30/2025.    Pt reports she is doing well no complaints.     Patient has 2-3 bowel movements a day. Stool Consistency soft and formed    Using the nifedipine cream prn     Past Medical History[1]  Past Surgical History[2]  Current Medications[3]  Allergies as of 06/04/2025 - Reviewed 06/04/2025   Allergen Reaction Noted    Kiwi extract - food allergy  07/09/2018    Other Other (See Comments) 04/08/2019    Peanut oil - food allergy  04/06/2015     Review of Systems   All other systems reviewed and are negative.    There were no vitals filed for this visit.  Physical Exam  Constitutional:       Appearance: Normal appearance.   HENT:      Head: Normocephalic and atraumatic.     Eyes:      Extraocular Movements: Extraocular movements intact.      Pupils: Pupils are equal, round, and reactive to light.     Pulmonary:      Effort: Pulmonary effort is normal.   Abdominal:      General: Abdomen is flat.   Genitourinary:     Comments: Healed anal fissure.  Small anal skin tag.    Musculoskeletal:         General: Normal range of motion.     Skin:     General: Skin is warm.     Neurological:      General: No  focal deficit present.      Mental Status: She is alert and oriented to person, place, and time.     Psychiatric:         Mood and Affect: Mood normal.         Behavior: Behavior normal.         Thought Content: Thought content normal.         Judgment: Judgment normal.              [1]   Past Medical History:  Diagnosis Date    ADHD     Allergic     Anxiety     Depression     Migraine    [2]   Past Surgical History:  Procedure Laterality Date    WISDOM TOOTH EXTRACTION     [3]   Current Outpatient Medications:     clobetasol (TEMOVATE) 0.05 % cream, Apply topically 2 (two) times a day for 10 days, Disp: 30 g, Rfl: 5    hydrocortisone (ANUSOL-HC) 2.5 % rectal cream, Apply topically 2 (two) times a day, Disp: 28 g, Rfl: 1    ibuprofen (MOTRIN) 600 mg tablet, Take 1 tablet (600 mg total) by mouth every 6 (six) hours as needed for mild pain, Disp: 60 tablet, Rfl: 0    montelukast (SINGULAIR) 10 mg tablet, TAKE 1 TABLET BY MOUTH EVERY DAY, Disp: 90 tablet, Rfl: 0    NIFEdipine 0.3%-lidocaine 5% rectal ointment, Apply 1 Application topically every 4 (four) hours as needed for discomfort or pain Apply a small amount to anal fissure, Disp: 50 g, Rfl: 2    nystatin-triamcinolone (MYCOLOG-II) ointment, Apply topically 2 (two) times a day, Disp: 30 g, Rfl: 5    omeprazole (PriLOSEC) 40 MG capsule, Take 1 capsule (40 mg total) by mouth daily before breakfast, Disp: 90 capsule, Rfl: 0    Ubrogepant (UBRELVY) 50 MG tablet, Take 1 tablet (50 mg) one time as needed for migraine. May repeat one additional tablet (50 mg) at least two hours after the first dose. Do not use more than two doses per day, or for more than eight days per month., Disp: 10 tablet, Rfl: 5    valACYclovir (VALTREX) 500 mg tablet, Take 1 tablet (500 mg total) by mouth 2 (two) times a day for 3 days, Disp: 6 tablet, Rfl: 0

## 2025-06-04 ENCOUNTER — OFFICE VISIT (OUTPATIENT)
Age: 25
End: 2025-06-04
Payer: COMMERCIAL

## 2025-06-04 VITALS — WEIGHT: 175 LBS | HEIGHT: 64 IN | BODY MASS INDEX: 29.88 KG/M2

## 2025-06-04 DIAGNOSIS — K60.2 ANAL FISSURE: Primary | ICD-10-CM

## 2025-06-04 PROCEDURE — 99213 OFFICE O/P EST LOW 20 MIN: CPT | Performed by: COLON & RECTAL SURGERY

## 2025-06-04 NOTE — ASSESSMENT & PLAN NOTE
Patient presents for follow-up of anal fissure.  She has been using nifedipine regularly, recently she has been using it more on an as-needed basis.  She notes her symptoms of bleeding and anorectal pain have resolved.  Examination shows a healed post anal fissure.    At this point in time I recommend cessation of the topical nifedipine and bowel regimen.  We discussed that this can be retreated in the future if symptoms recur.  If that is the case and symptoms persist she should be return for follow-up.  As she has no ongoing symptoms at present of anal pain or rectal bleeding, I recommend only observation.

## 2025-06-06 ENCOUNTER — OFFICE VISIT (OUTPATIENT)
Age: 25
End: 2025-06-06
Payer: COMMERCIAL

## 2025-06-06 VITALS
SYSTOLIC BLOOD PRESSURE: 138 MMHG | WEIGHT: 174.8 LBS | DIASTOLIC BLOOD PRESSURE: 82 MMHG | HEART RATE: 80 BPM | OXYGEN SATURATION: 99 % | HEIGHT: 64 IN | BODY MASS INDEX: 29.84 KG/M2 | TEMPERATURE: 98.4 F

## 2025-06-06 DIAGNOSIS — F90.0 ATTENTION DEFICIT HYPERACTIVITY DISORDER (ADHD), PREDOMINANTLY INATTENTIVE TYPE: Primary | ICD-10-CM

## 2025-06-06 PROCEDURE — 99213 OFFICE O/P EST LOW 20 MIN: CPT | Performed by: FAMILY MEDICINE

## 2025-06-06 RX ORDER — DEXTROAMPHETAMINE SACCHARATE, AMPHETAMINE ASPARTATE MONOHYDRATE, DEXTROAMPHETAMINE SULFATE AND AMPHETAMINE SULFATE 2.5; 2.5; 2.5; 2.5 MG/1; MG/1; MG/1; MG/1
10 CAPSULE, EXTENDED RELEASE ORAL EVERY MORNING
Qty: 30 CAPSULE | Refills: 0 | Status: SHIPPED | OUTPATIENT
Start: 2025-06-06

## 2025-06-06 NOTE — ASSESSMENT & PLAN NOTE
Orders:    Comprehensive metabolic panel; Future    CBC and differential; Future    Lipid panel; Future    TSH, 3rd generation with Free T4 reflex; Future    amphetamine-dextroamphetamine (ADDERALL XR, 10MG,) 10 MG 24 hr capsule; Take 1 capsule (10 mg total) by mouth every morning Max Daily Amount: 10 mg

## 2025-06-06 NOTE — PROGRESS NOTES
"Name: Shital Jones      : 2000      MRN: [id number]  Encounter Provider: Grady Morley DO  Encounter Date: 2025   Encounter department: [facility]  :  Assessment & Plan  Attention deficit hyperactivity disorder (ADHD), predominantly inattentive type    Orders:    Comprehensive metabolic panel; Future    CBC and differential; Future    Lipid panel; Future    TSH, 3rd generation with Free T4 reflex; Future    amphetamine-dextroamphetamine (ADDERALL XR, 10MG,) 10 MG 24 hr capsule; Take 1 capsule (10 mg total) by mouth every morning Max Daily Amount: 10 mg           History of Present Illness   Patient is here to follow-up on ADHD.  Patient off Adderall.  Patient has done well with Adderall in the past.  Patient with difficulty focusing and concentrating completing task.  Patient is missing appointments.      Review of Systems   Constitutional: Negative.    HENT: Negative.     Eyes: Negative.    Respiratory: Negative.     Cardiovascular: Negative.    Gastrointestinal: Negative.    Endocrine: Negative.    Genitourinary: Negative.    Musculoskeletal: Negative.    Skin: Negative.    Allergic/Immunologic: Negative.    Neurological: Negative.    Hematological: Negative.    Psychiatric/Behavioral:  Positive for decreased concentration.        Objective   /82 (BP Location: Right arm, Patient Position: Sitting, Cuff Size: Standard)   Pulse 80   Temp 98.4 °F (36.9 °C) (Temporal)   Ht 5' 4\" (1.626 m)   Wt 79.3 kg (174 lb 12.8 oz)   LMP 05/10/2025 (Exact Date)   SpO2 99%   BMI 30.00 kg/m²      Physical Exam  Vitals and nursing note reviewed.   Constitutional:       General: She is not in acute distress.     Appearance: Normal appearance. She is well-developed. She is not ill-appearing, toxic-appearing or diaphoretic.   HENT:      Head: Normocephalic and atraumatic.      Right Ear: External ear normal.      Left Ear: External ear normal.      Nose: Nose normal.     Eyes:      " General:         Right eye: No discharge.         Left eye: No discharge.     Neck:      Thyroid: No thyromegaly.      Vascular: No carotid bruit.      Trachea: No tracheal deviation.     Cardiovascular:      Rate and Rhythm: Normal rate and regular rhythm.      Heart sounds: Normal heart sounds. No murmur heard.     No gallop.   Pulmonary:      Effort: Pulmonary effort is normal. No respiratory distress.      Breath sounds: Normal breath sounds. No stridor. No wheezing or rales.   Chest:      Chest wall: No tenderness.   Abdominal:      General: Bowel sounds are normal.     Musculoskeletal:         General: No tenderness or deformity. Normal range of motion.      Cervical back: Normal range of motion and neck supple.   Lymphadenopathy:      Cervical: No cervical adenopathy.     Skin:     General: Skin is warm and dry.      Coloration: Skin is not pale.      Findings: No erythema or rash.     Neurological:      Mental Status: She is alert and oriented to person, place, and time. Mental status is at baseline.      Cranial Nerves: No cranial nerve deficit.      Motor: No abnormal muscle tone.      Coordination: Coordination normal.      Deep Tendon Reflexes: Reflexes normal.     Psychiatric:         Behavior: Behavior normal.         Thought Content: Thought content normal.         Judgment: Judgment normal.

## 2025-06-27 ENCOUNTER — HOSPITAL ENCOUNTER (OUTPATIENT)
Dept: ULTRASOUND IMAGING | Facility: CLINIC | Age: 25
Discharge: HOME | End: 2025-06-27
Attending: OBSTETRICS & GYNECOLOGY
Payer: COMMERCIAL

## 2025-06-27 DIAGNOSIS — N63.11 MASS OF UPPER OUTER QUADRANT OF RIGHT BREAST: ICD-10-CM

## 2025-06-27 PROCEDURE — 76642 ULTRASOUND BREAST LIMITED: CPT

## 2025-07-01 ENCOUNTER — APPOINTMENT (OUTPATIENT)
Dept: LAB | Facility: MEDICAL CENTER | Age: 25
End: 2025-07-01
Attending: FAMILY MEDICINE
Payer: COMMERCIAL

## 2025-07-01 DIAGNOSIS — F90.0 ATTENTION DEFICIT HYPERACTIVITY DISORDER (ADHD), PREDOMINANTLY INATTENTIVE TYPE: ICD-10-CM

## 2025-07-01 LAB
ALBUMIN SERPL BCG-MCNC: 4.3 G/DL (ref 3.5–5)
ALP SERPL-CCNC: 59 U/L (ref 34–104)
ALT SERPL W P-5'-P-CCNC: 21 U/L (ref 7–52)
ANION GAP SERPL CALCULATED.3IONS-SCNC: 8 MMOL/L (ref 4–13)
AST SERPL W P-5'-P-CCNC: 21 U/L (ref 13–39)
BASOPHILS # BLD AUTO: 0.03 THOUSANDS/ÂΜL (ref 0–0.1)
BASOPHILS NFR BLD AUTO: 0 % (ref 0–1)
BILIRUB SERPL-MCNC: 0.59 MG/DL (ref 0.2–1)
BUN SERPL-MCNC: 8 MG/DL (ref 5–25)
CALCIUM SERPL-MCNC: 8.9 MG/DL (ref 8.4–10.2)
CHLORIDE SERPL-SCNC: 107 MMOL/L (ref 96–108)
CHOLEST SERPL-MCNC: 123 MG/DL (ref ?–200)
CO2 SERPL-SCNC: 26 MMOL/L (ref 21–32)
CREAT SERPL-MCNC: 0.7 MG/DL (ref 0.6–1.3)
EOSINOPHIL # BLD AUTO: 0.37 THOUSAND/ÂΜL (ref 0–0.61)
EOSINOPHIL NFR BLD AUTO: 4 % (ref 0–6)
ERYTHROCYTE [DISTWIDTH] IN BLOOD BY AUTOMATED COUNT: 13.4 % (ref 11.6–15.1)
GFR SERPL CREATININE-BSD FRML MDRD: 121 ML/MIN/1.73SQ M
GLUCOSE P FAST SERPL-MCNC: 80 MG/DL (ref 65–99)
HCT VFR BLD AUTO: 34.8 % (ref 34.8–46.1)
HDLC SERPL-MCNC: 40 MG/DL
HGB BLD-MCNC: 11.3 G/DL (ref 11.5–15.4)
IMM GRANULOCYTES # BLD AUTO: 0.08 THOUSAND/UL (ref 0–0.2)
IMM GRANULOCYTES NFR BLD AUTO: 1 % (ref 0–2)
LDLC SERPL CALC-MCNC: 70 MG/DL (ref 0–100)
LYMPHOCYTES # BLD AUTO: 2.57 THOUSANDS/ÂΜL (ref 0.6–4.47)
LYMPHOCYTES NFR BLD AUTO: 28 % (ref 14–44)
MCH RBC QN AUTO: 28 PG (ref 26.8–34.3)
MCHC RBC AUTO-ENTMCNC: 32.5 G/DL (ref 31.4–37.4)
MCV RBC AUTO: 86 FL (ref 82–98)
MONOCYTES # BLD AUTO: 0.83 THOUSAND/ÂΜL (ref 0.17–1.22)
MONOCYTES NFR BLD AUTO: 9 % (ref 4–12)
NEUTROPHILS # BLD AUTO: 5.4 THOUSANDS/ÂΜL (ref 1.85–7.62)
NEUTS SEG NFR BLD AUTO: 58 % (ref 43–75)
NONHDLC SERPL-MCNC: 83 MG/DL
NRBC BLD AUTO-RTO: 0 /100 WBCS
PLATELET # BLD AUTO: 311 THOUSANDS/UL (ref 149–390)
PMV BLD AUTO: 9.8 FL (ref 8.9–12.7)
POTASSIUM SERPL-SCNC: 3.8 MMOL/L (ref 3.5–5.3)
PROT SERPL-MCNC: 6.8 G/DL (ref 6.4–8.4)
RBC # BLD AUTO: 4.04 MILLION/UL (ref 3.81–5.12)
SODIUM SERPL-SCNC: 141 MMOL/L (ref 135–147)
TRIGL SERPL-MCNC: 67 MG/DL (ref ?–150)
TSH SERPL DL<=0.05 MIU/L-ACNC: 1.49 UIU/ML (ref 0.45–4.5)
WBC # BLD AUTO: 9.28 THOUSAND/UL (ref 4.31–10.16)

## 2025-07-01 PROCEDURE — 84443 ASSAY THYROID STIM HORMONE: CPT

## 2025-07-01 PROCEDURE — 36415 COLL VENOUS BLD VENIPUNCTURE: CPT

## 2025-07-01 PROCEDURE — 85025 COMPLETE CBC W/AUTO DIFF WBC: CPT

## 2025-07-01 PROCEDURE — 80061 LIPID PANEL: CPT

## 2025-07-01 PROCEDURE — 80053 COMPREHEN METABOLIC PANEL: CPT

## 2025-07-24 ENCOUNTER — TELEPHONE (OUTPATIENT)
Age: 25
End: 2025-07-24

## 2025-07-24 NOTE — TELEPHONE ENCOUNTER
I tried to call pt to confirm their appt. I left a v/m for them to call use back or use [facility].

## 2025-07-29 ENCOUNTER — CONSULT (OUTPATIENT)
Age: 25
End: 2025-07-29

## 2025-07-29 VITALS — TEMPERATURE: 98.1 F | WEIGHT: 176 LBS | BODY MASS INDEX: 30.05 KG/M2 | HEIGHT: 64 IN

## 2025-07-29 DIAGNOSIS — N89.8 VAGINAL IRRITATION: ICD-10-CM

## 2025-07-29 DIAGNOSIS — R21 RASH: Primary | ICD-10-CM

## 2025-07-29 DIAGNOSIS — L73.2 HYDRADENITIS: ICD-10-CM

## 2025-07-29 RX ORDER — DOXYCYCLINE 100 MG/1
100 CAPSULE ORAL 2 TIMES DAILY WITH MEALS
Qty: 60 CAPSULE | Refills: 1 | Status: SHIPPED | OUTPATIENT
Start: 2025-07-29 | End: 2025-09-27

## 2025-07-29 RX ORDER — CLOBETASOL PROPIONATE 0.5 MG/G
CREAM TOPICAL 2 TIMES DAILY
Qty: 30 G | Refills: 5 | Status: SHIPPED | OUTPATIENT
Start: 2025-07-29 | End: 2025-08-08

## 2025-08-19 DIAGNOSIS — G43.009 MIGRAINE WITHOUT AURA AND WITHOUT STATUS MIGRAINOSUS, NOT INTRACTABLE: ICD-10-CM

## 2025-08-21 RX ORDER — IBUPROFEN 600 MG/1
600 TABLET, FILM COATED ORAL EVERY 6 HOURS PRN
Qty: 60 TABLET | Refills: 0 | Status: SHIPPED | OUTPATIENT
Start: 2025-08-21